# Patient Record
Sex: MALE | Race: WHITE | URBAN - NONMETROPOLITAN AREA
[De-identification: names, ages, dates, MRNs, and addresses within clinical notes are randomized per-mention and may not be internally consistent; named-entity substitution may affect disease eponyms.]

---

## 2021-09-29 ENCOUNTER — OFFICE VISIT (OUTPATIENT)
Dept: FAMILY MEDICINE CLINIC | Age: 51
End: 2021-09-29
Payer: MEDICAID

## 2021-09-29 VITALS
OXYGEN SATURATION: 97 % | SYSTOLIC BLOOD PRESSURE: 136 MMHG | WEIGHT: 282 LBS | HEART RATE: 66 BPM | DIASTOLIC BLOOD PRESSURE: 90 MMHG | BODY MASS INDEX: 42.74 KG/M2 | HEIGHT: 68 IN

## 2021-09-29 DIAGNOSIS — Z12.11 COLON CANCER SCREENING: ICD-10-CM

## 2021-09-29 DIAGNOSIS — G45.9 TIA (TRANSIENT ISCHEMIC ATTACK): ICD-10-CM

## 2021-09-29 DIAGNOSIS — R35.1 NOCTURIA: ICD-10-CM

## 2021-09-29 DIAGNOSIS — Z76.89 ENCOUNTER TO ESTABLISH CARE: Primary | ICD-10-CM

## 2021-09-29 DIAGNOSIS — I10 ESSENTIAL (PRIMARY) HYPERTENSION: ICD-10-CM

## 2021-09-29 DIAGNOSIS — E11.9 TYPE 2 DIABETES MELLITUS WITHOUT COMPLICATION, WITHOUT LONG-TERM CURRENT USE OF INSULIN (HCC): ICD-10-CM

## 2021-09-29 DIAGNOSIS — Z13.220 LIPID SCREENING: ICD-10-CM

## 2021-09-29 PROBLEM — E78.2 MIXED HYPERLIPIDEMIA: Status: ACTIVE | Noted: 2017-10-24

## 2021-09-29 PROBLEM — Z72.0 TOBACCO USE: Status: ACTIVE | Noted: 2017-10-24

## 2021-09-29 PROBLEM — K21.9 GASTRO-ESOPHAGEAL REFLUX DISEASE WITHOUT ESOPHAGITIS: Status: ACTIVE | Noted: 2017-10-24

## 2021-09-29 PROBLEM — Z72.0 TOBACCO USE: Status: RESOLVED | Noted: 2017-10-24 | Resolved: 2021-09-29

## 2021-09-29 PROBLEM — I25.10 CORONARY ARTERY DISEASE INVOLVING NATIVE CORONARY ARTERY OF NATIVE HEART WITHOUT ANGINA PECTORIS: Status: ACTIVE | Noted: 2017-10-24

## 2021-09-29 PROBLEM — K21.9 GASTRO-ESOPHAGEAL REFLUX DISEASE WITHOUT ESOPHAGITIS: Status: RESOLVED | Noted: 2017-10-24 | Resolved: 2021-09-29

## 2021-09-29 PROBLEM — Z12.5 ENCOUNTER FOR SCREENING FOR MALIGNANT NEOPLASM OF PROSTATE: Status: RESOLVED | Noted: 2017-10-24 | Resolved: 2021-09-29

## 2021-09-29 PROBLEM — Z12.5 ENCOUNTER FOR SCREENING FOR MALIGNANT NEOPLASM OF PROSTATE: Status: ACTIVE | Noted: 2017-10-24

## 2021-09-29 LAB — HBA1C MFR BLD: 14 %

## 2021-09-29 PROCEDURE — 36415 COLL VENOUS BLD VENIPUNCTURE: CPT

## 2021-09-29 PROCEDURE — 99205 OFFICE O/P NEW HI 60 MIN: CPT

## 2021-09-29 PROCEDURE — 83036 HEMOGLOBIN GLYCOSYLATED A1C: CPT

## 2021-09-29 RX ORDER — CLOPIDOGREL BISULFATE 75 MG/1
75 TABLET ORAL DAILY
Qty: 30 TABLET | Refills: 5 | Status: CANCELLED | OUTPATIENT
Start: 2021-09-29

## 2021-09-29 RX ORDER — GLIPIZIDE 5 MG/1
5 TABLET ORAL EVERY MORNING
COMMUNITY
End: 2021-09-29 | Stop reason: SDUPTHER

## 2021-09-29 RX ORDER — GLIPIZIDE 5 MG/1
5 TABLET ORAL EVERY MORNING
Qty: 30 TABLET | Refills: 5 | Status: SHIPPED | OUTPATIENT
Start: 2021-09-29 | End: 2021-11-22 | Stop reason: SDUPTHER

## 2021-09-29 RX ORDER — ASPIRIN 81 MG/1
81 TABLET ORAL DAILY
COMMUNITY

## 2021-09-29 RX ORDER — CLOPIDOGREL BISULFATE 75 MG/1
75 TABLET ORAL DAILY
COMMUNITY
End: 2022-04-05 | Stop reason: ALTCHOICE

## 2021-09-29 ASSESSMENT — PATIENT HEALTH QUESTIONNAIRE - PHQ9
2. FEELING DOWN, DEPRESSED OR HOPELESS: 0
1. LITTLE INTEREST OR PLEASURE IN DOING THINGS: 0
SUM OF ALL RESPONSES TO PHQ9 QUESTIONS 1 & 2: 0
SUM OF ALL RESPONSES TO PHQ QUESTIONS 1-9: 0

## 2021-09-29 ASSESSMENT — ENCOUNTER SYMPTOMS
COLOR CHANGE: 0
RHINORRHEA: 0
SHORTNESS OF BREATH: 0
ABDOMINAL DISTENTION: 0
EYE DISCHARGE: 0
CONSTIPATION: 0
TROUBLE SWALLOWING: 0
WHEEZING: 0
SORE THROAT: 0
CHOKING: 0
EYE PAIN: 0
CHEST TIGHTNESS: 0
COUGH: 0
DIARRHEA: 0
ABDOMINAL PAIN: 0

## 2021-09-29 NOTE — PROGRESS NOTES
New Patient      Sonny Vogel  YOB: 1970    Date of Service:  9/29/2021    Chief Complaint:   Sonny Vogel is a 45 y.o. female who presents for   Chief Complaint   Patient presents with   Mimi Pelaez New Doctor        HPI: here today to establish care with this office and this provider. Does not have a PCP. Pt states had stroke (TIA) 8/24/2021. Was admitted to [facility] for 2 days roughly. States they did an MRI and CT scans x2. Also did ECHO. Seeing Neurologist from [facility]. Still having trouble with right arm and hand feeling cold. Has to wrap in blanket at home or wear gloves outside. Went back to work yesterday for the first time and had trouble with right arm and hand because of the sensation while driving as a . States next sal to see Neuro is in 3 months. Pt was supposed to start gabapentin and did not start due to cost. Got Medicaid on 9/14/21 and is going to  medication this Friday. Also seeing Cardiologist at [facility]. Had Echo while admitted. States they told him his heart was functioning fine. Eather Feather note in care everywhere stats EF of 60-65%. Pt states Neuro started him on Plavix to take for the first 20 days after discharge and then was to change and start taking a 81mg baby ASA daily. States blood sugars are running 200-300. At times have been in the 400's. Was put on Glipizide 5mg daily after admitted in to [facility]. Still to continue upon discharge. Needs refill today. Will be out by the end of the week. Had not previously taken any medication to help control blood sugar. States has been trying to reduce consumption of sugar and reducing fried food. Knows BP has been elevated and needs to increase exercise and change dietary habits. Pt is currently trying to work on lowering BP. There is no immunization history on file for this patient.     No Known Allergies    Outpatient Medications Marked as Taking for Frequency of Social Gatherings with Friends and Family:     Attends Anabaptism Services:     Active Member of Clubs or Organizations:     Attends Club or Organization Meetings:     Marital Status:    Intimate Partner Violence:     Fear of Current or Ex-Partner:     Emotionally Abused:     Physically Abused:     Sexually Abused:        Review ofSystems:  Review of Systems   Constitutional: Negative for activity change, appetite change, chills, fatigue, fever and unexpected weight change. HENT: Negative for rhinorrhea, sore throat and trouble swallowing. Eyes: Negative for pain, discharge and visual disturbance. Respiratory: Negative for cough, choking, chest tightness, shortness of breath and wheezing. Cardiovascular: Negative for chest pain, palpitations and leg swelling. Gastrointestinal: Negative for abdominal distention, abdominal pain, constipation and diarrhea. Endocrine: Negative for cold intolerance and heat intolerance. Genitourinary: Negative for difficulty urinating. Nocturia    Musculoskeletal: Negative for gait problem and neck stiffness. Skin: Negative for color change and rash. Neurological: Negative for dizziness, weakness and headaches. Right arm feels very cold and sensitive to touch and cold. Psychiatric/Behavioral: Negative for dysphoric mood and sleep disturbance. Physical Exam:   BP (!) 136/90   Pulse 66   Ht 5' 8\" (1.727 m)   Wt 282 lb (127.9 kg)   SpO2 97%   BMI 42.88 kg/m²     Physical Exam  Constitutional:       Appearance: Normal appearance. He is obese. HENT:      Head: Normocephalic and atraumatic. Right Ear: External ear normal.      Left Ear: External ear normal.      Nose: Nose normal. No congestion. Mouth/Throat:      Mouth: Mucous membranes are moist.      Pharynx: Oropharynx is clear. Eyes:      Extraocular Movements: Extraocular movements intact. Pupils: Pupils are equal, round, and reactive to light. Cardiovascular:      Rate and Rhythm: Normal rate and regular rhythm. Pulses: Normal pulses. Heart sounds: Normal heart sounds. No murmur heard. Pulmonary:      Effort: Pulmonary effort is normal. No respiratory distress. Breath sounds: Normal breath sounds. No wheezing. Abdominal:      General: Bowel sounds are normal.      Palpations: Abdomen is soft. Tenderness: There is no abdominal tenderness. Musculoskeletal:         General: Normal range of motion. Cervical back: Normal range of motion and neck supple. Right lower leg: No edema. Left lower leg: No edema. Skin:     General: Skin is warm and dry. Capillary Refill: Capillary refill takes less than 2 seconds. Findings: No rash. Neurological:      General: No focal deficit present. Mental Status: He is alert and oriented to person, place, and time. Sensory: Sensory deficit present. Motor: No weakness. Comments: Right arm feels cold and very sensitive to touch     Psychiatric:         Mood and Affect: Mood normal.         Behavior: Behavior normal.         Assessment/Plan:    1. Encounter to establish care  Here today to establish care with this provider.   - COMPREHENSIVE METABOLIC PANEL; Future  - LIPID PANEL; Future  - CBC WITH AUTO DIFFERENTIAL; Future  - Psa screening; Future  - POCT glycosylated hemoglobin (Hb A1C)    2. TIA (transient ischemic attack)  Following Neurology and Cardiology at [address] [place] [address]; Future  - LIPID PANEL; Future  - CBC WITH AUTO DIFFERENTIAL; Future    3. Type 2 diabetes mellitus without complication, without long-term current use of insulin (HCC)  No base line labs today. Pt states sugars have been running in the 200-300's at times  - POCT glycosylated hemoglobin (Hb A1C)  Result of 14 today. Will go ahead and draw CMP now to evaluate Kidney function to determine next medication to be added to regimen.      4. Colon cancer screening  HX of colon polyps  - AFL - Chao Henao MD, Gastroenterology, [facility]    5. Nocturia   States has never had a PSA screen  - Psa screening; Future    6. Lipid screening  HX stroke and started on cholesterol medication but does not know name. Will call office with up to date med list.   - COMPREHENSIVE METABOLIC PANEL; Normal Clinic collect  - LIPID PANEL; Future  - CBC WITH AUTO DIFFERENTIAL; Future    7. Hypertension  Discussed diet an exercise. Diastolic BP at 90 today. Pt states he is trying to really reduce his sugar consumption as well as fried foods. Is trying to become more active after his TIA. Will reassess BP status at next office visit. 8. Declined Flu Vaccine at this time.

## 2021-09-29 NOTE — PATIENT INSTRUCTIONS
are trying to quit smoking. · Consider signing up for a smoking cessation program, such as the American Lung Association's Freedom from Smoking program.  · Get text messaging support. Go to the website at www.smokefree. gov to sign up for the [facility] program.  · Set a quit date. Pick your date carefully so that it is not right in the middle of a big deadline or stressful time. Once you quit, do not even take a puff. Get rid of all ashtrays and lighters after your last cigarette. Clean your house and your clothes so that they do not smell of smoke. · Learn how to be a nonsmoker. Think about ways you can avoid those things that make you reach for a cigarette. ? Avoid situations that put you at greatest risk for smoking. For some people, it is hard to have a drink with friends without smoking. For others, they might skip a coffee break with coworkers who smoke. ? Change your daily routine. Take a different route to work or eat a meal in a different place. · Cut down on stress. Calm yourself or release tension by doing an activity you enjoy, such as reading a book, taking a hot bath, or gardening. · Talk to your doctor or pharmacist about nicotine replacement therapy, which replaces the nicotine in your body. You still get nicotine but you do not use tobacco. Nicotine replacement products help you slowly reduce the amount of nicotine you need. These products come in several forms, many of them available over-the-counter:  ? Nicotine patches  ? Nicotine gum and lozenges  ? Nicotine inhaler  · Ask your doctor about bupropion (Wellbutrin) or varenicline (Chantix), which are prescription medicines. They do not contain nicotine. They help you by reducing withdrawal symptoms, such as stress and anxiety. · Some people find hypnosis, acupuncture, and massage helpful for ending the smoking habit. · Eat a healthy diet and get regular exercise.  Having healthy habits will help your body move past its craving for nicotine. · Be prepared to keep trying. Most people are not successful the first few times they try to quit. Do not get mad at yourself if you smoke again. Make a list of things you learned and think about when you want to try again, such as next week, next month, or next year. Where can you learn more? Go to https://[employer]". org and sign in to your [employer] account. Enter G819 in the [employer] box to learn more about \"Stopping Smoking: Care Instructions. \"     If you do not have an account, please click on the \"Sign Up Now\" link. Current as of: February 11, 2021               Content Version: 13.0  © 2006-2021 Healthwise, Incorporated. Care instructions adapted under license by [facility] [address] If you have questions about a medical condition or this instruction, always ask your healthcare professional. [place] [address] any warranty or liability for your use of this information.

## 2021-09-30 LAB
A/G RATIO: 1.3 (ref 1.1–2.2)
ALBUMIN SERPL-MCNC: 3.9 G/DL (ref 3.4–5)
ALP BLD-CCNC: 86 U/L (ref 40–129)
ALT SERPL-CCNC: 21 U/L (ref 10–40)
ANION GAP SERPL CALCULATED.3IONS-SCNC: 16 MMOL/L (ref 3–16)
AST SERPL-CCNC: 12 U/L (ref 15–37)
BILIRUB SERPL-MCNC: 0.4 MG/DL (ref 0–1)
BUN BLDV-MCNC: 16 MG/DL (ref 7–20)
CALCIUM SERPL-MCNC: 9.4 MG/DL (ref 8.3–10.6)
CHLORIDE BLD-SCNC: 96 MMOL/L (ref 99–110)
CO2: 23 MMOL/L (ref 21–32)
CREAT SERPL-MCNC: 0.7 MG/DL (ref 0.9–1.3)
GFR AFRICAN AMERICAN: >60
GFR NON-AFRICAN AMERICAN: >60
GLOBULIN: 3.1 G/DL
GLUCOSE BLD-MCNC: 396 MG/DL (ref 70–99)
POTASSIUM SERPL-SCNC: 4.4 MMOL/L (ref 3.5–5.1)
SODIUM BLD-SCNC: 135 MMOL/L (ref 136–145)
TOTAL PROTEIN: 7 G/DL (ref 6.4–8.2)

## 2021-09-30 RX ORDER — METFORMIN HYDROCHLORIDE 500 MG/1
TABLET, EXTENDED RELEASE ORAL
Qty: 90 TABLET | Refills: 1 | Status: SHIPPED | OUTPATIENT
Start: 2021-09-30 | End: 2021-11-22 | Stop reason: SDUPTHER

## 2021-10-01 LAB
CHOLESTEROL, FASTING: 216
HDLC SERPL-MCNC: 34 MG/DL (ref 35–70)
LDL CHOLESTEROL CALCULATED: 131.4 MG/DL (ref 0–160)
PROSTATE SPECIFIC ANTIGEN: 0.5 NG/ML
TRIGLYCERIDE, FASTING: 253

## 2021-10-04 DIAGNOSIS — Z00.00 WELLNESS EXAMINATION: Primary | ICD-10-CM

## 2021-11-22 RX ORDER — GLIPIZIDE 5 MG/1
5 TABLET ORAL EVERY MORNING
Qty: 90 TABLET | Refills: 3 | Status: SHIPPED | OUTPATIENT
Start: 2021-11-22 | End: 2022-01-04

## 2021-11-22 RX ORDER — METFORMIN HYDROCHLORIDE 500 MG/1
1000 TABLET, EXTENDED RELEASE ORAL 2 TIMES DAILY
Qty: 360 TABLET | Refills: 3 | Status: SHIPPED | OUTPATIENT
Start: 2021-11-22 | End: 2022-04-04 | Stop reason: SDUPTHER

## 2022-01-04 ENCOUNTER — OFFICE VISIT (OUTPATIENT)
Dept: FAMILY MEDICINE CLINIC | Age: 52
End: 2022-01-04
Payer: COMMERCIAL

## 2022-01-04 VITALS
DIASTOLIC BLOOD PRESSURE: 90 MMHG | HEART RATE: 74 BPM | HEIGHT: 68 IN | OXYGEN SATURATION: 97 % | WEIGHT: 289.6 LBS | RESPIRATION RATE: 16 BRPM | BODY MASS INDEX: 43.89 KG/M2 | SYSTOLIC BLOOD PRESSURE: 144 MMHG

## 2022-01-04 DIAGNOSIS — R03.0 ELEVATED BP WITHOUT DIAGNOSIS OF HYPERTENSION: ICD-10-CM

## 2022-01-04 DIAGNOSIS — E11.9 TYPE 2 DIABETES MELLITUS WITHOUT COMPLICATION, WITHOUT LONG-TERM CURRENT USE OF INSULIN (HCC): Primary | ICD-10-CM

## 2022-01-04 DIAGNOSIS — Z12.11 COLON CANCER SCREENING: ICD-10-CM

## 2022-01-04 LAB — HBA1C MFR BLD: 12.8 %

## 2022-01-04 PROCEDURE — 2022F DILAT RTA XM EVC RTNOPTHY: CPT

## 2022-01-04 PROCEDURE — 3046F HEMOGLOBIN A1C LEVEL >9.0%: CPT

## 2022-01-04 PROCEDURE — G8427 DOCREV CUR MEDS BY ELIG CLIN: HCPCS

## 2022-01-04 PROCEDURE — 83036 HEMOGLOBIN GLYCOSYLATED A1C: CPT

## 2022-01-04 PROCEDURE — 99214 OFFICE O/P EST MOD 30 MIN: CPT

## 2022-01-04 PROCEDURE — 3017F COLORECTAL CA SCREEN DOC REV: CPT

## 2022-01-04 PROCEDURE — G8484 FLU IMMUNIZE NO ADMIN: HCPCS

## 2022-01-04 PROCEDURE — G8417 CALC BMI ABV UP PARAM F/U: HCPCS

## 2022-01-04 PROCEDURE — 4004F PT TOBACCO SCREEN RCVD TLK: CPT

## 2022-01-04 RX ORDER — GABAPENTIN 300 MG/1
CAPSULE ORAL
COMMUNITY
Start: 2021-10-01

## 2022-01-04 RX ORDER — DULAGLUTIDE 0.75 MG/.5ML
0.75 INJECTION, SOLUTION SUBCUTANEOUS WEEKLY
Qty: 1 PEN | Refills: 5 | Status: SHIPPED | OUTPATIENT
Start: 2022-01-04 | End: 2022-04-05 | Stop reason: DRUGHIGH

## 2022-01-04 RX ORDER — GLIPIZIDE 5 MG/1
5 TABLET ORAL
Qty: 90 TABLET | Refills: 3 | Status: SHIPPED | OUTPATIENT
Start: 2022-01-04 | End: 2022-04-05 | Stop reason: SDUPTHER

## 2022-01-04 RX ORDER — ATORVASTATIN CALCIUM 40 MG/1
TABLET, FILM COATED ORAL
COMMUNITY
Start: 2021-12-27 | End: 2022-04-05 | Stop reason: SDUPTHER

## 2022-01-04 ASSESSMENT — ENCOUNTER SYMPTOMS
SORE THROAT: 0
CONSTIPATION: 0
COLOR CHANGE: 0
DIARRHEA: 0
EYE DISCHARGE: 0
ABDOMINAL PAIN: 0
TROUBLE SWALLOWING: 0
CHOKING: 0
EYE PAIN: 0
RHINORRHEA: 0
ABDOMINAL DISTENTION: 0
CHEST TIGHTNESS: 0
SHORTNESS OF BREATH: 0
COUGH: 0
WHEEZING: 0

## 2022-01-04 NOTE — PROGRESS NOTES
Chief Complaint   Patient presents with    3 Month Follow-Up       HPI:  Nathalie Salguero is a 45 y.o. (: 1970) here today   for 3 month follow up DM. HBA1C today is 12.8. Improved from last visit in 2021 when HBA1C was 14. Since last visit has for the most part cut out sweets. Eat sugar free candy, cookies. Has been eating more fruits. 2 bananas a day with some strawberries or blueberries daily. Eats good amounts of vegetables. Still is drinking sweet tea but really reduced the sugar mixture. HTN: BP elevated today. 144/90. Admits he salts a lot of his food. Patient's medications, allergies, past medical, surgical, social and family histories were reviewed and updated as appropriate. ROS:  Review of Systems   Constitutional: Negative for activity change, appetite change, chills, fatigue, fever and unexpected weight change. HENT: Negative for rhinorrhea, sore throat and trouble swallowing. Eyes: Negative for pain, discharge and visual disturbance. Respiratory: Negative for cough, choking, chest tightness, shortness of breath and wheezing. Cardiovascular: Negative for chest pain, palpitations and leg swelling. Gastrointestinal: Negative for abdominal distention, abdominal pain, constipation and diarrhea. Endocrine: Negative for cold intolerance and heat intolerance. Genitourinary: Negative for difficulty urinating. Musculoskeletal: Negative for gait problem and neck stiffness. Skin: Negative for color change and rash. Neurological: Negative for dizziness, weakness and headaches. Psychiatric/Behavioral: Negative for dysphoric mood and sleep disturbance.            Hemoglobin A1C (%)   Date Value   2022 12.8     LDL Calculated (mg/dL)   Date Value   10/01/2021 131.4       Past Medical History:   Diagnosis Date    Coronary artery disease involving native heart without angina pectoris     Gastro-esophageal reflux disease without esophagitis 10/24/2017    Mixed hyperglyceridemia     Nonrheumatic tricuspid valve regurgitation     Sleep apnea     TIA (transient ischemic attack)     Tobacco use 10/24/2017       Family History   Problem Relation Age of Onset    Diabetes Mother     High Blood Pressure Mother     Heart Disease Mother     Heart Disease Father     COPD Father     High Blood Pressure Father     Diabetes Sister        Social History     Socioeconomic History    Marital status:      Spouse name: Not on file    Number of children: Not on file    Years of education: Not on file    Highest education level: Not on file   Occupational History    Not on file   Tobacco Use    Smoking status: Never Smoker    Smokeless tobacco: Current User     Types: Snuff   Vaping Use    Vaping Use: Never used   Substance and Sexual Activity    Alcohol use: Never    Drug use: Never    Sexual activity: Not on file   Other Topics Concern    Not on file   Social History Narrative    Not on file     Social Determinants of Health     Financial Resource Strain:     Difficulty of Paying Living Expenses: Not on file   Food Insecurity:     Worried About [address] in the Last Year: Not on file    [address] in the Last Year: Not on file   Transportation Needs:     Lack of Transportation (Medical): Not on file    Lack of Transportation (Non-Medical):  Not on file   Physical Activity:     Days of Exercise per Week: Not on file    Minutes of Exercise per Session: Not on file   Stress:     Feeling of Stress : Not on file   Social Connections:     Frequency of Communication with Friends and Family: Not on file    Frequency of Social Gatherings with Friends and Family: Not on file    Attends Hindu Services: Not on file    Active Member of Clubs or Organizations: Not on file    Attends Club or Organization Meetings: Not on file    Marital Status: Not on file   Intimate Partner Violence:     Fear of Current or Ex-Partner: Not on file   [facility] Emotionally Abused: Not on file    Physically Abused: Not on file    Sexually Abused: Not on file   Housing Stability:     Unable to Pay for Housing in the Last Year: Not on file    Number of Places Lived in the Last Year: Not on file    Unstable Housing in the Last Year: Not on file       Prior to Visit Medications    Medication Sig Taking? Authorizing Provider   gabapentin (NEURONTIN) 300 MG capsule  Yes Historical Provider, MD   Dulaglutide (TRULICITY) 5.52 GM/5.1LJ SOPN Inject 0.75 mg into the skin once a week Yes HOWARD Serra CNP   glipiZIDE (GLUCOTROL) 5 MG tablet Take 1 tablet by mouth 2 times daily (before meals) Yes HOWARD Serra CNP   metFORMIN (GLUCOPHAGE-XR) 500 MG extended release tablet Take 2 tablets by mouth 2 times daily Yes Oksana Perez MD   aspirin 81 MG EC tablet Take 81 mg by mouth daily Yes Historical Provider, MD   atorvastatin (LIPITOR) 40 MG tablet   Historical Provider, MD   clopidogrel (PLAVIX) 75 MG tablet Take 75 mg by mouth daily  Historical Provider, MD       No Known Allergies    OBJECTIVE:    BP (!) 144/90 (Site: Right Upper Arm, Position: Sitting, Cuff Size: Large Adult)   Pulse 74   Resp 16   Ht 5' 8\" (1.727 m)   Wt 289 lb 9.6 oz (131.4 kg)   SpO2 97%   BMI 44.03 kg/m²     BP Readings from Last 2 Encounters:   01/04/22 (!) 144/90   09/29/21 (!) 136/90       Wt Readings from Last 3 Encounters:   01/04/22 289 lb 9.6 oz (131.4 kg)   09/29/21 282 lb (127.9 kg)       Physical Exam  Constitutional:       Appearance: Normal appearance. HENT:      Head: Normocephalic and atraumatic. Right Ear: External ear normal.      Left Ear: External ear normal.      Nose: Nose normal. No congestion. Mouth/Throat:      Mouth: Mucous membranes are moist.      Pharynx: Oropharynx is clear. Eyes:      Extraocular Movements: Extraocular movements intact. Pupils: Pupils are equal, round, and reactive to light.    Cardiovascular:      Rate and Rhythm: Normal rate and regular rhythm. Pulses: Normal pulses. Heart sounds: Normal heart sounds. No murmur heard. Pulmonary:      Effort: Pulmonary effort is normal. No respiratory distress. Breath sounds: Normal breath sounds. No wheezing. Abdominal:      General: Bowel sounds are normal.      Palpations: Abdomen is soft. Tenderness: There is no abdominal tenderness. Musculoskeletal:         General: Normal range of motion. Cervical back: Normal range of motion and neck supple. Right lower leg: No edema. Left lower leg: No edema. Comments: Cold feeling to right arm. Skin:     General: Skin is warm and dry. Capillary Refill: Capillary refill takes less than 2 seconds. Findings: No rash. Neurological:      General: No focal deficit present. Mental Status: He is alert and oriented to person, place, and time. Sensory: Sensory deficit present. Motor: No weakness. Comments: Cold feeling to right arm   Psychiatric:         Mood and Affect: Mood normal.         Behavior: Behavior normal.           ASSESSMENT/PLAN:    1. Type 2 diabetes mellitus without complication, without long-term current use of insulin ([facility])  Patient hemoglobin A1c improved today at 12.8. Last office visit his A1c was 14. Discussed with patient this result 12 is significantly elevated. I am going to increase the patient's daily dose of glipizide to twice a day 5 mg tablet. I am also going to add Trulicity to his medication regimen, see below. I really stressed the importance of reducing carbohydrates from the patient's diet to help reduce hemoglobin A1c result. Patient states he will start monitoring his diet even closer to help reduce amount of sugar he is consuming daily. Patient will also monitor blood sugars at home closer more so the interim starting Trulicity. I will office now if having any adverse reactions to being placed on Trulicity.   Patient will follow

## 2022-04-04 RX ORDER — METFORMIN HYDROCHLORIDE 500 MG/1
1000 TABLET, EXTENDED RELEASE ORAL 2 TIMES DAILY
Qty: 360 TABLET | Refills: 3 | Status: SHIPPED | OUTPATIENT
Start: 2022-04-04 | End: 2022-07-03

## 2022-04-05 ENCOUNTER — OFFICE VISIT (OUTPATIENT)
Dept: FAMILY MEDICINE CLINIC | Age: 52
End: 2022-04-05
Payer: COMMERCIAL

## 2022-04-05 VITALS
WEIGHT: 287 LBS | OXYGEN SATURATION: 95 % | SYSTOLIC BLOOD PRESSURE: 148 MMHG | DIASTOLIC BLOOD PRESSURE: 86 MMHG | HEART RATE: 72 BPM | BODY MASS INDEX: 43.64 KG/M2

## 2022-04-05 DIAGNOSIS — N49.2 SCROTAL ABSCESS: ICD-10-CM

## 2022-04-05 DIAGNOSIS — I10 ESSENTIAL (PRIMARY) HYPERTENSION: ICD-10-CM

## 2022-04-05 DIAGNOSIS — E11.9 TYPE 2 DIABETES MELLITUS WITHOUT COMPLICATION, WITHOUT LONG-TERM CURRENT USE OF INSULIN (HCC): Primary | ICD-10-CM

## 2022-04-05 DIAGNOSIS — E78.2 MIXED HYPERLIPIDEMIA: ICD-10-CM

## 2022-04-05 LAB
CREATININE URINE: 138.7 MG/DL (ref 39–259)
HBA1C MFR BLD: 8.9 %
MICROALBUMIN UR-MCNC: <1.2 MG/DL
MICROALBUMIN/CREAT UR-RTO: NORMAL MG/G (ref 0–30)

## 2022-04-05 PROCEDURE — 3052F HG A1C>EQUAL 8.0%<EQUAL 9.0%: CPT

## 2022-04-05 PROCEDURE — 4004F PT TOBACCO SCREEN RCVD TLK: CPT

## 2022-04-05 PROCEDURE — G8427 DOCREV CUR MEDS BY ELIG CLIN: HCPCS

## 2022-04-05 PROCEDURE — G8417 CALC BMI ABV UP PARAM F/U: HCPCS

## 2022-04-05 PROCEDURE — 2022F DILAT RTA XM EVC RTNOPTHY: CPT

## 2022-04-05 PROCEDURE — 99214 OFFICE O/P EST MOD 30 MIN: CPT

## 2022-04-05 PROCEDURE — 83036 HEMOGLOBIN GLYCOSYLATED A1C: CPT

## 2022-04-05 PROCEDURE — 3017F COLORECTAL CA SCREEN DOC REV: CPT

## 2022-04-05 RX ORDER — DULAGLUTIDE 1.5 MG/.5ML
1.5 INJECTION, SOLUTION SUBCUTANEOUS WEEKLY
Qty: 4 PEN | Refills: 3 | Status: SHIPPED | OUTPATIENT
Start: 2022-04-05 | End: 2022-07-27

## 2022-04-05 RX ORDER — GLIPIZIDE 5 MG/1
5 TABLET ORAL
Qty: 180 TABLET | Refills: 3 | Status: SHIPPED | OUTPATIENT
Start: 2022-04-05 | End: 2022-07-04

## 2022-04-05 RX ORDER — HYDROCHLOROTHIAZIDE 12.5 MG/1
12.5 CAPSULE, GELATIN COATED ORAL EVERY MORNING
Qty: 30 CAPSULE | Refills: 1 | Status: SHIPPED | OUTPATIENT
Start: 2022-04-05

## 2022-04-05 RX ORDER — ATORVASTATIN CALCIUM 40 MG/1
40 TABLET, FILM COATED ORAL DAILY
Qty: 90 TABLET | Refills: 3 | Status: SHIPPED | OUTPATIENT
Start: 2022-04-05

## 2022-04-05 RX ORDER — SULFAMETHOXAZOLE AND TRIMETHOPRIM 800; 160 MG/1; MG/1
1 TABLET ORAL 2 TIMES DAILY
Qty: 20 TABLET | Refills: 0 | Status: SHIPPED | OUTPATIENT
Start: 2022-04-05 | End: 2022-04-15

## 2022-04-05 RX ORDER — DULAGLUTIDE 0.75 MG/.5ML
0.75 INJECTION, SOLUTION SUBCUTANEOUS WEEKLY
Qty: 3 PEN | Refills: 3 | Status: CANCELLED | OUTPATIENT
Start: 2022-04-05

## 2022-04-05 ASSESSMENT — ENCOUNTER SYMPTOMS
ABDOMINAL PAIN: 0
EYE PAIN: 0
DIARRHEA: 0
SHORTNESS OF BREATH: 0
EYE DISCHARGE: 0
SORE THROAT: 0
CONSTIPATION: 0
ABDOMINAL DISTENTION: 0
RHINORRHEA: 0
CHOKING: 0
WHEEZING: 0
TROUBLE SWALLOWING: 0
COUGH: 0
ROS SKIN COMMENTS: SCROTAL ABSCESS
COLOR CHANGE: 0
CHEST TIGHTNESS: 0

## 2022-04-05 NOTE — PROGRESS NOTES
Chief Complaint   Patient presents with    Diabetes       HPI:  Mignon Tapia is a 47 y.o. (: 1970) here today   for DM f/u. DM: HBA1C 8.9 today in office. Is on Metformin 6597PQ BID, Trulicity 5.73PT weekly, Glipizide 5mg BID. Previous HBA1C was 12.8. Has made major diet changes also and has really reduced sugar consumption daily. Is not getting up as frequent to urinate nightly. BP elevated today. Hx of HTN and was on BP meds in the past. Not currently. Recheck 148/86. Will try low dose HCTZ. Needs refill on cholesterol med    Has abscess on scrotum. Had to have I&D at [facility] for this issue last year. Was left open with packing. States never really fully healed. Had a little knot on the scrotum that was residual from previous infection. Over the last month the are has really flared up and is uncomfortable. Is swollen up interferes with his job as a  sitting down. Having ongoing issues with right arm cold feeling and pins and needles. Started when had TIA. Neuro has pt on Gabapentin but pt has a hard time tolerating because he states makes him urinate. Still seeing Neuro. Seeing again in May 2022. Patient's medications, allergies, past medical, surgical, social and family histories were reviewed and updated as appropriate. ROS:  Review of Systems   Constitutional: Negative for activity change, appetite change, chills, fatigue, fever and unexpected weight change. HENT: Negative for rhinorrhea, sore throat and trouble swallowing. Eyes: Negative for pain, discharge and visual disturbance. Respiratory: Negative for cough, choking, chest tightness, shortness of breath and wheezing. Cardiovascular: Negative for chest pain, palpitations and leg swelling. Gastrointestinal: Negative for abdominal distention, abdominal pain, constipation and diarrhea. Endocrine: Negative for cold intolerance and heat intolerance. Genitourinary: Negative for difficulty urinating. Musculoskeletal: Negative for gait problem and neck stiffness. Skin: Positive for wound. Negative for color change and rash. Scrotal abscess     Neurological: Negative for dizziness, weakness and headaches. Still having cold feelings down his right arm with tingling. [facility] Neuro [facility]   Psychiatric/Behavioral: Negative for dysphoric mood and sleep disturbance.            Hemoglobin A1C (%)   Date Value   04/05/2022 8.9     LDL Calculated (mg/dL)   Date Value   10/01/2021 131.4       Past Medical History:   Diagnosis Date    Coronary artery disease involving native heart without angina pectoris     Gastro-esophageal reflux disease without esophagitis 10/24/2017    Mixed hyperglyceridemia     Nonrheumatic tricuspid valve regurgitation     Sleep apnea     TIA (transient ischemic attack)     Tobacco use 10/24/2017       Family History   Problem Relation Age of Onset    Diabetes Mother     High Blood Pressure Mother     Heart Disease Mother     Heart Disease Father     COPD Father     High Blood Pressure Father     Diabetes Sister        Social History     Socioeconomic History    Marital status:      Spouse name: Not on file    Number of children: Not on file    Years of education: Not on file    Highest education level: Not on file   Occupational History    Not on file   Tobacco Use    Smoking status: Never Smoker    Smokeless tobacco: Current User     Types: Snuff   Vaping Use    Vaping Use: Never used   Substance and Sexual Activity    Alcohol use: Never    Drug use: Never    Sexual activity: Not on file   Other Topics Concern    Not on file   Social History Narrative    Not on file     Social Determinants of Health     Financial Resource Strain:     Difficulty of Paying Living Expenses: Not on file   Food Insecurity:     Worried About Running Out of Food in the Last Year: Not on file    [address] in the Last Year: Not on file   Transportation Needs:     Lack of Transportation (Medical): Not on file    Lack of Transportation (Non-Medical): Not on file   Physical Activity:     Days of Exercise per Week: Not on file    Minutes of Exercise per Session: Not on file   Stress:     Feeling of Stress : Not on file   Social Connections:     Frequency of Communication with Friends and Family: Not on file    Frequency of Social Gatherings with Friends and Family: Not on file    Attends Quaker Services: Not on file    Active Member of [address] [place] [address] or Organizations: Not on file    Attends Club or Organization Meetings: Not on file    Marital Status: Not on file   Intimate Partner Violence:     Fear of Current or Ex-Partner: Not on file    Emotionally Abused: Not on file    Physically Abused: Not on file    Sexually Abused: Not on file   Housing Stability:     Unable to Pay for Housing in the Last Year: Not on file    Number of [place] in the Last Year: Not on file    Unstable Housing in the Last Year: Not on file       Prior to Visit Medications    Medication Sig Taking?  Authorizing Provider   atorvastatin (LIPITOR) 40 MG tablet Take 1 tablet by mouth daily Yes HOWARD Valles CNP   glipiZIDE (GLUCOTROL) 5 MG tablet Take 1 tablet by mouth 2 times daily (before meals) Yes HOWARD Valles CNP   Dulaglutide (TRULICITY) 1.5 NG/4.8WV SOPN Inject 1.5 mg into the skin once a week Yes HOWARD Valles CNP   sulfamethoxazole-trimethoprim (BACTRIM DS;SEPTRA DS) 800-160 MG per tablet Take 1 tablet by mouth 2 times daily for 10 days Yes HOWARD Valles CNP   metFORMIN (GLUCOPHAGE-XR) 500 MG extended release tablet Take 2 tablets by mouth 2 times daily Yes HOWARD Valles CNP   gabapentin (NEURONTIN) 300 MG capsule  Yes Historical Provider, MD   aspirin 81 MG EC tablet Take 81 mg by mouth daily Yes Historical Provider, MD       No Known Allergies    OBJECTIVE:    BP (!) 156/90   Pulse 72   Wt 287 lb (130.2 kg)   SpO2 95%   BMI 43.64 kg/m²     BP Readings from Last 2 Encounters:   04/05/22 (!) 156/90   01/04/22 (!) 144/90       Wt Readings from Last 3 Encounters:   04/05/22 287 lb (130.2 kg)   01/04/22 289 lb 9.6 oz (131.4 kg)   09/29/21 282 lb (127.9 kg)     Sensory exam of the foot is normal, tested with the monofilament. Good pulses, no lesions or ulcers, good peripheral pulses. Physical Exam  Constitutional:       Appearance: Normal appearance. HENT:      Head: Normocephalic and atraumatic. Right Ear: External ear normal.      Left Ear: External ear normal.      Nose: Nose normal. No congestion. Mouth/Throat:      Mouth: Mucous membranes are moist.      Pharynx: Oropharynx is clear. Eyes:      Extraocular Movements: Extraocular movements intact. Pupils: Pupils are equal, round, and reactive to light. Cardiovascular:      Rate and Rhythm: Normal rate and regular rhythm. Pulses: Normal pulses. Heart sounds: Normal heart sounds. No murmur heard. Pulmonary:      Effort: Pulmonary effort is normal. No respiratory distress. Breath sounds: Normal breath sounds. No wheezing. Abdominal:      General: Bowel sounds are normal.      Palpations: Abdomen is soft. Tenderness: There is no abdominal tenderness. Musculoskeletal:         General: Normal range of motion. Cervical back: Normal range of motion and neck supple. Right lower leg: No edema. Left lower leg: No edema. Skin:     General: Skin is warm and dry. Capillary Refill: Capillary refill takes less than 2 seconds. Findings: Erythema present. No rash. Comments: Scrotal abscess to bottom side of scrotum. Not draining    Neurological:      General: No focal deficit present. Mental Status: He is alert and oriented to person, place, and time. Motor: No weakness. Psychiatric:         Mood and Affect: Mood normal.         Behavior: Behavior normal.           ASSESSMENT/PLAN:    1.  Type 2 diabetes mellitus without complication, without long-term current use of insulin ([facility])  Hemoglobin A1c much improved today. See above HPI for hemoglobin A1c improvement in results. We will increase the patient's Trulicity to 1.5 mg weekly. Patient is still elevated with his hemoglobin A1c at 8.9. Patient continues to monitor daily sugar consumption and make dietary changes to help improve fasting blood sugars. I advised the patient to continue monitoring his fasting blood sugars and notify the office if he starts develop any low blood sugar readings or feelings of low blood sugar after starting on the increased dose of Trulicity. Patient verbalized understanding.  - glipiZIDE (GLUCOTROL) 5 MG tablet; Take 1 tablet by mouth 2 times daily (before meals)  Dispense: 180 tablet; Refill: 3  - MICROALBUMIN / CREATININE URINE RATIO  - POCT glycosylated hemoglobin (Hb A1C)  - Dulaglutide (TRULICITY) 1.5 SB/9.3VG SOPN; Inject 1.5 mg into the skin once a week  Dispense: 4 pen; Refill: 3  - [facility] DIABETES FOOT EXAM  - Comprehensive Metabolic Panel; Future    2. Scrotal abscess  Patient had noted scrotal abscess to the bottom side of his scrotum there are office visit today. Not a new issue the patient has had lanced in the past at [facility].  Patient states he never fully recovered from his previous I&D of the area and that there was still residual knot/lump to the area that has worsened over the last couple months. The area was not draining urine examination today however it was inflamed and red. Noted swelling. I will place the patient on Bactrim at this time and have him follow-up Dr. Garcia Setting for evaluation to determine if the affected area needs to be opened and drained. - sulfamethoxazole-trimethoprim (BACTRIM DS;SEPTRA DS) 800-160 MG per tablet; Take 1 tablet by mouth 2 times daily for 10 days  Dispense: 20 tablet; Refill: 0  - [address] Ch[address] MD, General Surgery, [facility]    3.  Mixed hyperlipidemia  Medication refill today. Repeat labs ordered. For the time being we will continue on current medication regimen as ordered. We will determine if medication changes are needed once lipid panel result is resulted to me. - atorvastatin (LIPITOR) 40 MG tablet; Take 1 tablet by mouth daily  Dispense: 90 tablet; Refill: 3  - LIPID PANEL; Future    4. Essential HTN  Patient elevated blood pressure in office today with a recheck of 148/86. Patient has a history of hypertension and is not currently on blood pressure medication. We will order hydrochlorothiazide 12.5 mg daily. Patient will follow up in office in 1 month for blood pressure. -HCTZ 12.5mg capsule daily (30 tabs with 1 refill)    35 minutes spent on patient care today    This document was prepared by a combination of typing and transcription through a voice recognition software.     HOWARD Valles - CNP

## 2022-05-03 LAB
GDT REPLACEMENT: NORMAL
Lab: NORMAL
PATHOLOGY DIAGNOSIS: NORMAL
SIGNATURE: NORMAL
SPECIMEN: NORMAL
SPECIMEN: NORMAL

## 2022-05-04 ENCOUNTER — TELEPHONE (OUTPATIENT)
Dept: SURGERY | Age: 52
End: 2022-05-04

## 2022-05-04 NOTE — TELEPHONE ENCOUNTER
----- Message from Sarahy Amezcua MD sent at 5/4/2022  8:15 AM EDT -----  Tell the patient that the lump removed was a benign cyst.  Follow up 2 weeks for suture removal.

## 2022-05-12 ENCOUNTER — OFFICE VISIT (OUTPATIENT)
Dept: FAMILY MEDICINE CLINIC | Age: 52
End: 2022-05-12
Payer: COMMERCIAL

## 2022-05-12 VITALS
DIASTOLIC BLOOD PRESSURE: 86 MMHG | BODY MASS INDEX: 42.42 KG/M2 | HEART RATE: 73 BPM | OXYGEN SATURATION: 96 % | SYSTOLIC BLOOD PRESSURE: 138 MMHG | WEIGHT: 279 LBS

## 2022-05-12 DIAGNOSIS — R19.6 BREATH ODOR: ICD-10-CM

## 2022-05-12 DIAGNOSIS — E11.9 TYPE 2 DIABETES MELLITUS WITHOUT COMPLICATION, WITHOUT LONG-TERM CURRENT USE OF INSULIN (HCC): ICD-10-CM

## 2022-05-12 DIAGNOSIS — I10 ESSENTIAL (PRIMARY) HYPERTENSION: ICD-10-CM

## 2022-05-12 DIAGNOSIS — F41.9 ANXIETY AND DEPRESSION: Primary | ICD-10-CM

## 2022-05-12 DIAGNOSIS — F32.A ANXIETY AND DEPRESSION: Primary | ICD-10-CM

## 2022-05-12 DIAGNOSIS — G45.9 TIA (TRANSIENT ISCHEMIC ATTACK): ICD-10-CM

## 2022-05-12 DIAGNOSIS — E78.2 MIXED HYPERLIPIDEMIA: ICD-10-CM

## 2022-05-12 DIAGNOSIS — Z76.89 ENCOUNTER TO ESTABLISH CARE: ICD-10-CM

## 2022-05-12 DIAGNOSIS — T28.2XXA: ICD-10-CM

## 2022-05-12 DIAGNOSIS — Z13.220 LIPID SCREENING: ICD-10-CM

## 2022-05-12 LAB
A/G RATIO: 1.2 (ref 1.1–2.2)
ALBUMIN SERPL-MCNC: 4.4 G/DL (ref 3.4–5)
ALP BLD-CCNC: 95 U/L (ref 40–129)
ALT SERPL-CCNC: 17 U/L (ref 10–40)
ANION GAP SERPL CALCULATED.3IONS-SCNC: 16 MMOL/L (ref 3–16)
AST SERPL-CCNC: 12 U/L (ref 15–37)
BASOPHILS ABSOLUTE: 0.1 K/UL (ref 0–0.2)
BASOPHILS RELATIVE PERCENT: 0.5 %
BILIRUB SERPL-MCNC: 0.6 MG/DL (ref 0–1)
BUN BLDV-MCNC: 19 MG/DL (ref 7–20)
CALCIUM SERPL-MCNC: 10.3 MG/DL (ref 8.3–10.6)
CHLORIDE BLD-SCNC: 96 MMOL/L (ref 99–110)
CHOLESTEROL, TOTAL: 124 MG/DL (ref 0–199)
CO2: 24 MMOL/L (ref 21–32)
CREAT SERPL-MCNC: 0.9 MG/DL (ref 0.9–1.3)
EOSINOPHILS ABSOLUTE: 0.2 K/UL (ref 0–0.6)
EOSINOPHILS RELATIVE PERCENT: 1.8 %
GFR AFRICAN AMERICAN: >60
GFR NON-AFRICAN AMERICAN: >60
GLUCOSE BLD-MCNC: 141 MG/DL (ref 70–99)
HCT VFR BLD CALC: 44.3 % (ref 40.5–52.5)
HDLC SERPL-MCNC: 32 MG/DL (ref 40–60)
HEMOGLOBIN: 14.6 G/DL (ref 13.5–17.5)
LDL CHOLESTEROL CALCULATED: 58 MG/DL
LYMPHOCYTES ABSOLUTE: 2.5 K/UL (ref 1–5.1)
LYMPHOCYTES RELATIVE PERCENT: 22 %
MCH RBC QN AUTO: 28.2 PG (ref 26–34)
MCHC RBC AUTO-ENTMCNC: 33 G/DL (ref 31–36)
MCV RBC AUTO: 85.6 FL (ref 80–100)
MONOCYTES ABSOLUTE: 0.9 K/UL (ref 0–1.3)
MONOCYTES RELATIVE PERCENT: 7.6 %
NEUTROPHILS ABSOLUTE: 7.9 K/UL (ref 1.7–7.7)
NEUTROPHILS RELATIVE PERCENT: 68.1 %
PDW BLD-RTO: 13.2 % (ref 12.4–15.4)
PLATELET # BLD: 332 K/UL (ref 135–450)
PMV BLD AUTO: 8.1 FL (ref 5–10.5)
POTASSIUM SERPL-SCNC: 4.6 MMOL/L (ref 3.5–5.1)
RBC # BLD: 5.17 M/UL (ref 4.2–5.9)
SODIUM BLD-SCNC: 136 MMOL/L (ref 136–145)
TOTAL PROTEIN: 8 G/DL (ref 6.4–8.2)
TRIGL SERPL-MCNC: 170 MG/DL (ref 0–150)
VLDLC SERPL CALC-MCNC: 34 MG/DL
WBC # BLD: 11.6 K/UL (ref 4–11)

## 2022-05-12 PROCEDURE — 36415 COLL VENOUS BLD VENIPUNCTURE: CPT

## 2022-05-12 PROCEDURE — 2022F DILAT RTA XM EVC RTNOPTHY: CPT

## 2022-05-12 PROCEDURE — G8417 CALC BMI ABV UP PARAM F/U: HCPCS

## 2022-05-12 PROCEDURE — 3017F COLORECTAL CA SCREEN DOC REV: CPT

## 2022-05-12 PROCEDURE — 3052F HG A1C>EQUAL 8.0%<EQUAL 9.0%: CPT

## 2022-05-12 PROCEDURE — 99215 OFFICE O/P EST HI 40 MIN: CPT

## 2022-05-12 PROCEDURE — G8427 DOCREV CUR MEDS BY ELIG CLIN: HCPCS

## 2022-05-12 PROCEDURE — 4004F PT TOBACCO SCREEN RCVD TLK: CPT

## 2022-05-12 RX ORDER — BUSPIRONE HYDROCHLORIDE 10 MG/1
10 TABLET ORAL 2 TIMES DAILY PRN
Qty: 60 TABLET | Refills: 2 | Status: SHIPPED | OUTPATIENT
Start: 2022-05-12 | End: 2022-07-11

## 2022-05-12 RX ORDER — PANTOPRAZOLE SODIUM 20 MG/1
20 TABLET, DELAYED RELEASE ORAL 2 TIMES DAILY
Qty: 30 TABLET | Refills: 3 | Status: SHIPPED | OUTPATIENT
Start: 2022-05-12

## 2022-05-12 RX ORDER — ESCITALOPRAM OXALATE 10 MG/1
10 TABLET ORAL DAILY
Qty: 30 TABLET | Refills: 3 | Status: SHIPPED | OUTPATIENT
Start: 2022-05-12

## 2022-05-12 ASSESSMENT — PATIENT HEALTH QUESTIONNAIRE - PHQ9
4. FEELING TIRED OR HAVING LITTLE ENERGY: 3
5. POOR APPETITE OR OVEREATING: 2
8. MOVING OR SPEAKING SO SLOWLY THAT OTHER PEOPLE COULD HAVE NOTICED. OR THE OPPOSITE, BEING SO FIGETY OR RESTLESS THAT YOU HAVE BEEN MOVING AROUND A LOT MORE THAN USUAL: 1
7. TROUBLE CONCENTRATING ON THINGS, SUCH AS READING THE NEWSPAPER OR WATCHING TELEVISION: 2
1. LITTLE INTEREST OR PLEASURE IN DOING THINGS: 2
SUM OF ALL RESPONSES TO PHQ QUESTIONS 1-9: 17
SUM OF ALL RESPONSES TO PHQ9 QUESTIONS 1 & 2: 5
2. FEELING DOWN, DEPRESSED OR HOPELESS: 3
SUM OF ALL RESPONSES TO PHQ QUESTIONS 1-9: 17
9. THOUGHTS THAT YOU WOULD BE BETTER OFF DEAD, OR OF HURTING YOURSELF: 0
3. TROUBLE FALLING OR STAYING ASLEEP: 1
SUM OF ALL RESPONSES TO PHQ QUESTIONS 1-9: 17
6. FEELING BAD ABOUT YOURSELF - OR THAT YOU ARE A FAILURE OR HAVE LET YOURSELF OR YOUR FAMILY DOWN: 3
SUM OF ALL RESPONSES TO PHQ QUESTIONS 1-9: 17
10. IF YOU CHECKED OFF ANY PROBLEMS, HOW DIFFICULT HAVE THESE PROBLEMS MADE IT FOR YOU TO DO YOUR WORK, TAKE CARE OF THINGS AT HOME, OR GET ALONG WITH OTHER PEOPLE: 2

## 2022-05-12 ASSESSMENT — ENCOUNTER SYMPTOMS
SORE THROAT: 0
BLOOD IN STOOL: 1
EYE PAIN: 0
EYE DISCHARGE: 0
CONSTIPATION: 0
CHEST TIGHTNESS: 0
WHEEZING: 0
TROUBLE SWALLOWING: 0
COUGH: 0
ABDOMINAL PAIN: 0
DIARRHEA: 0
RECTAL PAIN: 1
SHORTNESS OF BREATH: 0
RHINORRHEA: 0
COLOR CHANGE: 0
CHOKING: 0
ABDOMINAL DISTENTION: 1

## 2022-05-12 NOTE — PROGRESS NOTES
Chief Complaint   Patient presents with    Hypertension    Diabetes    Anxiety    GI Problem     stomach burning        HPI:  Sharon Daily is a 47 y.o. (: 1970) here today   for routine and additional issues listed below. HTN: 142/90 today initially. Taking HCTZ 12.5mg daily. Will have CMP today. BP recheck of 138/86. DM: taking Metformin 1000mg BID, Glipizide 5mg BID, Trulicity 6.2LJ weekly. Not due for HBA1C. Most recent was 8.9 on 22. Hyperlipidemia:taking Lipitor 40mg daily. Will have lipid panel today. GI: still having burning in stomach. Has been having some bleeding out of his rectum. Has history of hemorrhoids. Bleeding has been on and off for years. Does not know if r/t meds or something else. When belching has very foul taste in mouth. Possible H. Pylori or Gastroparesis. Will cut metformin to 1000mg daily x7 days and follow back up with office phone call. Anxiety: started about 2 months ago. Father passed away in January. Since then things have went down hill. Has gotten to the point he does not want to be around anyone. Has missed work a lot because he just make himself go to work. Has no drive to do anything. Feels like he's done his part in life and just doesn't know what to do. Really misses his dad who was his best friend. Now talks to his daughter Mak Gaston a lot about everything. Feels like he has no direction. Father passed away in pt home. Pt sleeps a lot. Denies suicidal ideation. Patient's medications, allergies, past medical, surgical, social and family histories were reviewed and updated as appropriate. ROS:  Review of Systems   Constitutional: Negative for activity change, appetite change, chills, fatigue, fever and unexpected weight change. HENT: Negative for rhinorrhea, sore throat and trouble swallowing. Eyes: Negative for pain, discharge and visual disturbance.    Respiratory: Negative for cough, choking, chest tightness, shortness of breath and wheezing. Cardiovascular: Negative for chest pain, palpitations and leg swelling. Gastrointestinal: Positive for abdominal distention, blood in stool and rectal pain. Negative for abdominal pain, constipation and diarrhea. Genitourinary: Negative for difficulty urinating. Musculoskeletal: Negative for gait problem and neck stiffness. Skin: Negative for color change and rash. Neurological: Negative for dizziness, weakness and headaches. Psychiatric/Behavioral: Positive for agitation and dysphoric mood. Negative for sleep disturbance. The patient is nervous/anxious.             Hemoglobin A1C (%)   Date Value   04/05/2022 8.9     Microalbumin, Random Urine (mg/dL)   Date Value   04/05/2022 <1.20     LDL Calculated (mg/dL)   Date Value   10/01/2021 131.4       Past Medical History:   Diagnosis Date    Coronary artery disease involving native heart without angina pectoris     Gastro-esophageal reflux disease without esophagitis 10/24/2017    Mixed hyperglyceridemia     Nonrheumatic tricuspid valve regurgitation     Sleep apnea     TIA (transient ischemic attack)     Tobacco use 10/24/2017       Family History   Problem Relation Age of Onset    Diabetes Mother     High Blood Pressure Mother     Heart Disease Mother     Heart Disease Father     COPD Father     High Blood Pressure Father     Diabetes Sister        Social History     Socioeconomic History    Marital status:      Spouse name: Not on file    Number of children: Not on file    Years of education: Not on file    Highest education level: Not on file   Occupational History    Not on file   Tobacco Use    Smoking status: Never Smoker    Smokeless tobacco: Current User     Types: Snuff   Vaping Use    Vaping Use: Never used   Substance and Sexual Activity    Alcohol use: Never    Drug use: Never    Sexual activity: Not on file   Other Topics Concern    Not on file   Social History Narrative    Not on file     Social Determinants of Health     Financial Resource Strain:     Difficulty of Paying Living Expenses: Not on file   Food Insecurity:     Worried About Running Out of Food in the Last Year: Not on file    [place] [employer] Food in the Last Year: Not on file   Transportation Needs:     Lack of Transportation (Medical): Not on file    Lack of Transportation (Non-Medical): Not on file   Physical Activity:     Days of Exercise per Week: Not on file    Minutes of Exercise per Session: Not on file   Stress:     Feeling of Stress : Not on file   Social Connections:     Frequency of Communication with Friends and Family: Not on file    Frequency of Social Gatherings with Friends and Family: Not on file    Attends Latter-day Services: Not on file    Active Member of [address] [place] [address] [employer] or Organizations: Not on file    Attends Club or Organization Meetings: Not on file    Marital Status: Not on file   Intimate Partner Violence:     Fear of Current or Ex-Partner: Not on file    Emotionally Abused: Not on file    Physically Abused: Not on file    Sexually Abused: Not on file   Housing Stability:     Unable to Pay for Housing in the Last Year: Not on file    Number of [place] in the Last Year: Not on file    Unstable Housing in the Last Year: Not on file       Prior to Visit Medications    Medication Sig Taking?  Authorizing Provider   pantoprazole (PROTONIX) 20 MG tablet Take 1 tablet by mouth in the morning and at bedtime Yes HOWARD Godoy CNP   escitalopram (LEXAPRO) 10 MG tablet Take 1 tablet by mouth daily Yes HOWARD Godoy CNP   busPIRone (BUSPAR) 10 MG tablet Take 1 tablet by mouth 2 times daily as needed (anxiety) Yes HOWARD Godoy CNP   atorvastatin (LIPITOR) 40 MG tablet Take 1 tablet by mouth daily Yes HOWARD Godoy CNP   glipiZIDE (GLUCOTROL) 5 MG tablet Take 1 tablet by mouth 2 times daily (before meals) Yes HOWARD Godoy CNP   Dulaglutide (TRULICITY) 1.5 RV/5.0RR SOPN Inject 1.5 mg into the skin once a week Yes HOWARD Razo CNP   hydroCHLOROthiazide (MICROZIDE) 12.5 MG capsule Take 1 capsule by mouth every morning Yes HOWARD Razo CNP   metFORMIN (GLUCOPHAGE-XR) 500 MG extended release tablet Take 2 tablets by mouth 2 times daily Yes HOWARD Razo CNP   gabapentin (NEURONTIN) 300 MG capsule  Yes Historical Provider, MD   aspirin 81 MG EC tablet Take 81 mg by mouth daily Yes Historical Provider, MD       No Known Allergies    OBJECTIVE:    /86 (Site: Right Upper Arm, Position: Sitting)   Pulse 73   Wt 279 lb (126.6 kg)   SpO2 96%   BMI 42.42 kg/m²     BP Readings from Last 2 Encounters:   05/12/22 138/86   04/05/22 (!) 148/86       Wt Readings from Last 3 Encounters:   05/12/22 279 lb (126.6 kg)   04/05/22 287 lb (130.2 kg)   01/04/22 289 lb 9.6 oz (131.4 kg)       Physical Exam  Constitutional:       Appearance: Normal appearance. HENT:      Head: Normocephalic and atraumatic. Mouth/Throat:      Mouth: Mucous membranes are moist.      Pharynx: Oropharynx is clear. Eyes:      Extraocular Movements: Extraocular movements intact. Pupils: Pupils are equal, round, and reactive to light. Cardiovascular:      Rate and Rhythm: Normal rate and regular rhythm. Pulses: Normal pulses. Heart sounds: Normal heart sounds. No murmur heard. Pulmonary:      Effort: Pulmonary effort is normal. No respiratory distress. Breath sounds: Normal breath sounds. No wheezing. Abdominal:      General: Bowel sounds are normal.      Palpations: Abdomen is soft. Tenderness: There is no abdominal tenderness. Musculoskeletal:         General: Normal range of motion. Cervical back: Normal range of motion and neck supple. Right lower leg: No edema. Left lower leg: No edema. Skin:     General: Skin is warm and dry. Capillary Refill: Capillary refill takes less than 2 seconds. Findings: No rash. Neurological:      General: No focal deficit present. Mental Status: He is alert and oriented to person, place, and time. Motor: No weakness. Psychiatric:      Comments: Anxious and dysphoric in office today. ASSESSMENT/PLAN:    1. Anxiety and depression  See above HPI for patient symptoms and onset. Patient has a great deal of anxiety and depression in our office visit today. We discussed patient's situation and I let him speak openly regard to his feelings and stressors. We ultimately concluded together to start the patient on Lexapro 10 mg daily and discussed that this medication needs roughly 4 to 6 weeks to build up the chemical effect within the brain to reach desired effect. Patient verbalized understanding and will give the allotted time needed for the medication to work. In the interim given the patient's increased symptoms I also going to add BuSpar 10 mg twice daily as needed. Patient was advised to contact the office if he develops any adverse effects to the medication at which time we would stop the medication and regroup and explore other options to manage his symptoms. Patient will follow up with office in 2 to 3 weeks for follow-up starting medication list below. - escitalopram (LEXAPRO) 10 MG tablet; Take 1 tablet by mouth daily  Dispense: 30 tablet; Refill: 3  - busPIRone (BUSPAR) 10 MG tablet; Take 1 tablet by mouth 2 times daily as needed (anxiety)  Dispense: 60 tablet; Refill: 2    2. Burn of stomach, initial encounter  Not a new issue. Will order GI referral for EGD as well as colonoscopy for intermittent rectal bleeding see above HPI. I explained the patient's symptoms could be related to gastritis, stomach ulcer, reflux. We will trial him on Protonix 20 mg twice daily at this time and defer to the GI specialist moving forward if changes are needed to the patient's medication regimen based on her findings.   Also discussed with patient could be experiencing gastroparesis related to his diabetes. - Waldemar Restrepo MD, Gastroenterology, [facility]  - pantoprazole (PROTONIX) 20 MG tablet; Take 1 tablet by mouth in the morning and at bedtime  Dispense: 30 tablet; Refill: 3    3. Breath odor  Patient to have performed at [facility] ED for [address] the patient we need to assess for H. pylori because if he is positive for H. pylori this needs treated with antibiotics. We need to rule out this possibility given the patient's breath odor and stomach burning. We will await testing results and follow back up with patient.  - H. Pylori Breath Test, Adult; Future    4. Type 2 diabetes mellitus without complication, without long-term current use of insulin ([address]  Patient not due for repeat hemoglobin A1c at this time. Patient is having issues with foul breath odor and burning in his stomach that is ongoing. I discussed the patient there is a possibility he could be experiencing gastroparesis. We are going to cut the patient's metformin dosing to 1000 mg daily x7 days the patient will follow back up with me through telephone call in office to update me on how his symptoms are at that time. I discussed with patient there is a possibility Protonix may also improve his symptoms of stomach burning. At the patient's foul breath improved there is a high likelihood his symptoms were related to gastroparesis. I told him we do not want to stay off his metformin very long and that we will most likely resume back to the twice daily dosing with or without improvements of his symptoms in 7 days. Patient has significant improvement on his most recent hemoglobin A1c as listed above in the HPI. 5. Essential (primary) hypertension  Controlled in office today 138/86. Patient going to have repeat CMP performed in office today that was ordered as future order during her last visit. No medication changes at this time.   We will continue on current medication regimen at this time. 6. Mixed hyperlipidemia  Patient having lipid panel performed in office today that was ordered as a future order via our last office visit. We will continue on current medication regimen at this time. 50 minutes spent on patient care today. This document was prepared by a combination of typing and transcription through a voice recognition software.     HOWARD Harper - CNP

## 2022-05-13 NOTE — RESULT ENCOUNTER NOTE
Lipids well controlled. Significantly improved from last check. Continue medication. CMP essentially normal except glucose. White blood cell count minimally elevated of uncertain etiology. Otherwise CBC normal.  Monitor for now.

## 2022-06-16 ENCOUNTER — OFFICE VISIT (OUTPATIENT)
Dept: FAMILY MEDICINE CLINIC | Age: 52
End: 2022-06-16
Payer: COMMERCIAL

## 2022-06-16 VITALS
OXYGEN SATURATION: 96 % | SYSTOLIC BLOOD PRESSURE: 122 MMHG | HEIGHT: 68 IN | HEART RATE: 77 BPM | DIASTOLIC BLOOD PRESSURE: 76 MMHG | WEIGHT: 279.2 LBS | BODY MASS INDEX: 42.31 KG/M2

## 2022-06-16 DIAGNOSIS — F41.9 ANXIETY AND DEPRESSION: ICD-10-CM

## 2022-06-16 DIAGNOSIS — F32.A ANXIETY AND DEPRESSION: ICD-10-CM

## 2022-06-16 DIAGNOSIS — I10 ESSENTIAL (PRIMARY) HYPERTENSION: Primary | ICD-10-CM

## 2022-06-16 PROCEDURE — 99213 OFFICE O/P EST LOW 20 MIN: CPT

## 2022-06-16 PROCEDURE — 3017F COLORECTAL CA SCREEN DOC REV: CPT

## 2022-06-16 PROCEDURE — 4004F PT TOBACCO SCREEN RCVD TLK: CPT

## 2022-06-16 PROCEDURE — G8417 CALC BMI ABV UP PARAM F/U: HCPCS

## 2022-06-16 PROCEDURE — G8427 DOCREV CUR MEDS BY ELIG CLIN: HCPCS

## 2022-06-16 SDOH — ECONOMIC STABILITY: FOOD INSECURITY: WITHIN THE PAST 12 MONTHS, YOU WORRIED THAT YOUR FOOD WOULD RUN OUT BEFORE YOU GOT MONEY TO BUY MORE.: NEVER TRUE

## 2022-06-16 SDOH — ECONOMIC STABILITY: FOOD INSECURITY: WITHIN THE PAST 12 MONTHS, THE FOOD YOU BOUGHT JUST DIDN'T LAST AND YOU DIDN'T HAVE MONEY TO GET MORE.: NEVER TRUE

## 2022-06-16 ASSESSMENT — PATIENT HEALTH QUESTIONNAIRE - PHQ9
2. FEELING DOWN, DEPRESSED OR HOPELESS: 0
SUM OF ALL RESPONSES TO PHQ QUESTIONS 1-9: 0
SUM OF ALL RESPONSES TO PHQ9 QUESTIONS 1 & 2: 2
8. MOVING OR SPEAKING SO SLOWLY THAT OTHER PEOPLE COULD HAVE NOTICED. OR THE OPPOSITE, BEING SO FIGETY OR RESTLESS THAT YOU HAVE BEEN MOVING AROUND A LOT MORE THAN USUAL: 0
SUM OF ALL RESPONSES TO PHQ QUESTIONS 1-9: 0
10. IF YOU CHECKED OFF ANY PROBLEMS, HOW DIFFICULT HAVE THESE PROBLEMS MADE IT FOR YOU TO DO YOUR WORK, TAKE CARE OF THINGS AT HOME, OR GET ALONG WITH OTHER PEOPLE: 1
7. TROUBLE CONCENTRATING ON THINGS, SUCH AS READING THE NEWSPAPER OR WATCHING TELEVISION: 0
SUM OF ALL RESPONSES TO PHQ QUESTIONS 1-9: 0
SUM OF ALL RESPONSES TO PHQ9 QUESTIONS 1 & 2: 0
3. TROUBLE FALLING OR STAYING ASLEEP: 0
SUM OF ALL RESPONSES TO PHQ QUESTIONS 1-9: 2
6. FEELING BAD ABOUT YOURSELF - OR THAT YOU ARE A FAILURE OR HAVE LET YOURSELF OR YOUR FAMILY DOWN: 0
9. THOUGHTS THAT YOU WOULD BE BETTER OFF DEAD, OR OF HURTING YOURSELF: 0
4. FEELING TIRED OR HAVING LITTLE ENERGY: 0
SUM OF ALL RESPONSES TO PHQ QUESTIONS 1-9: 2
1. LITTLE INTEREST OR PLEASURE IN DOING THINGS: 2
2. FEELING DOWN, DEPRESSED OR HOPELESS: 0
SUM OF ALL RESPONSES TO PHQ QUESTIONS 1-9: 0
SUM OF ALL RESPONSES TO PHQ QUESTIONS 1-9: 2
1. LITTLE INTEREST OR PLEASURE IN DOING THINGS: 0
5. POOR APPETITE OR OVEREATING: 0
SUM OF ALL RESPONSES TO PHQ QUESTIONS 1-9: 2

## 2022-06-16 ASSESSMENT — ENCOUNTER SYMPTOMS
DIARRHEA: 0
BLOOD IN STOOL: 0
SHORTNESS OF BREATH: 0
CHEST TIGHTNESS: 0
BLURRED VISION: 0
CONSTIPATION: 0

## 2022-06-16 ASSESSMENT — SOCIAL DETERMINANTS OF HEALTH (SDOH): HOW HARD IS IT FOR YOU TO PAY FOR THE VERY BASICS LIKE FOOD, HOUSING, MEDICAL CARE, AND HEATING?: NOT HARD AT ALL

## 2022-06-16 NOTE — PROGRESS NOTES
Chief Complaint   Patient presents with    Hypertension       HPI:  Stephanie Chen is a 47 y.o. (: 1970) here today   for   Hypertension  This is a chronic problem. The current episode started more than 1 year ago. The problem is controlled. Pertinent negatives include no blurred vision, chest pain, headaches, malaise/fatigue, palpitations or shortness of breath. There are no associated agents to hypertension. Risk factors for coronary artery disease include diabetes mellitus, male gender and obesity. Past treatments include diuretics. The current treatment provides mild improvement. There are no compliance problems. BP today 122/76    Anxiety and depression: is on Lexapro and Buspar. Feels a sigficant difference in his symptoms for the good. Has been wanting to get at it and go. Is working daily and getting so many other things done. Is enjoying company again and has things to look forward to. The last three days has some swelling and knot to the trulicity infection site. Would itch. Not issue today with the site. Has been on for a while. Not a new med. Advised to monitor for now. Patient's medications, allergies, past medical, surgical, social and family histories were reviewed and updated as appropriate. ROS:  Review of Systems   Constitutional: Negative for chills, fatigue, fever and malaise/fatigue. Eyes: Negative for blurred vision. Respiratory: Negative for chest tightness and shortness of breath. Cardiovascular: Negative for chest pain and palpitations. Gastrointestinal: Negative for blood in stool, constipation and diarrhea. Neurological: Negative for dizziness, light-headedness and headaches. Psychiatric/Behavioral: Negative. Prior to Visit Medications    Medication Sig Taking?  Authorizing Provider   pantoprazole (PROTONIX) 20 MG tablet Take 1 tablet by mouth in the morning and at bedtime Yes HOWARD Lowry - CNP   escitalopram (LEXAPRO) 10 MG tablet Take 1 tablet by mouth daily Yes HOWARD Chavira CNP   busPIRone (BUSPAR) 10 MG tablet Take 1 tablet by mouth 2 times daily as needed (anxiety) Yes HOWARD Chavira CNP   atorvastatin (LIPITOR) 40 MG tablet Take 1 tablet by mouth daily Yes HOWARD Chavira CNP   glipiZIDE (GLUCOTROL) 5 MG tablet Take 1 tablet by mouth 2 times daily (before meals) Yes HOWARD Chavira CNP   Dulaglutide (TRULICITY) 1.5 DU/2.9SA SOPN Inject 1.5 mg into the skin once a week Yes HOWARD Chavira CNP   hydroCHLOROthiazide (MICROZIDE) 12.5 MG capsule Take 1 capsule by mouth every morning Yes HOWARD Chavira CNP   metFORMIN (GLUCOPHAGE-XR) 500 MG extended release tablet Take 2 tablets by mouth 2 times daily Yes HOWARD Chavira CNP   gabapentin (NEURONTIN) 300 MG capsule  Yes Historical Provider, MD   aspirin 81 MG EC tablet Take 81 mg by mouth daily Yes Historical Provider, MD       No Known Allergies    OBJECTIVE:    /76   Pulse 77   Ht 5' 8\" (1.727 m)   Wt 279 lb 3.2 oz (126.6 kg)   SpO2 96%   BMI 42.45 kg/m²     BP Readings from Last 2 Encounters:   06/16/22 122/76   05/12/22 138/86       Wt Readings from Last 3 Encounters:   06/16/22 279 lb 3.2 oz (126.6 kg)   05/12/22 279 lb (126.6 kg)   04/05/22 287 lb (130.2 kg)        Physical Exam  Constitutional:       Appearance: Normal appearance. HENT:      Head: Normocephalic and atraumatic. Right Ear: External ear normal.      Left Ear: External ear normal.      Nose: Nose normal. No congestion. Mouth/Throat:      Mouth: Mucous membranes are moist.      Pharynx: Oropharynx is clear. Eyes:      Extraocular Movements: Extraocular movements intact. Pupils: Pupils are equal, round, and reactive to light. Cardiovascular:      Rate and Rhythm: Normal rate and regular rhythm. Pulses: Normal pulses. Heart sounds: Normal heart sounds. No murmur heard.       Pulmonary:      Effort: Pulmonary effort is normal. No respiratory distress. Breath sounds: Normal breath sounds. No wheezing. Abdominal:      General: Bowel sounds are normal.      Palpations: Abdomen is soft. Tenderness: There is no abdominal tenderness. Musculoskeletal:         General: Normal range of motion. Skin:     General: Skin is warm and dry. Capillary Refill: Capillary refill takes less than 2 seconds. Findings: No rash. Neurological:      General: No focal deficit present. Mental Status: He is alert and oriented to person, place, and time. Motor: No weakness. Psychiatric:         Mood and Affect: Mood normal.         Behavior: Behavior normal.           ASSESSMENT/PLAN:    1. Essential (primary) hypertension  BP stable in office today at 122/76. We will continue on current medication regimen at this time. Patient will follow back up in office in 3 months. 2. Anxiety and depression  Controlled on current medication regimen at this time. Continue on med regimen as ordered. 20 minutes spent on patient care today    This document was prepared by a combination of typing and transcription through a voice recognition software.     Zoya Hernandez, HOWARD - CNP

## 2022-06-16 NOTE — PATIENT INSTRUCTIONS
Patient Education        Stopping Smokeless Tobacco Use: Care Instructions  Your Care Instructions     Smokeless tobacco comes in many forms, such as snuff and chewing tobacco:   Snuff is finely ground tobacco sold in cans or pouches. Most of the time, snuff is used by putting a \"pinch\" or \"dip\" between the lower lip or cheek and the gum.  Chewing tobacco is sold as loose leaves, plugs, or twists. It is chewed or placed between the cheek and the gum or teeth. There are plenty of reasons to stop using smokeless tobacco. These products are harmful. They are not risk-free alternatives to smoking. Smokeless tobacco contains nicotine, which is addicting. Though using smokeless tobacco is lessharmful than smoking cigarettes, it can cause serious health problems, such as:   White patches or red sores in your mouth that can turn into mouth cancer involving the lip, tongue, or cheek.  Tooth loss and other dental problems.  Gum disease. Your gums may pull away from your teeth and not grow back. People who use smokeless tobacco crave the nicotine in it. Giving up smokeless tobacco is much harder than simply changing a habit. Your body has to stop craving the nicotine. It is hard to quit, but you can do it. Many tools are available for people who want to quit using smokeless tobacco. You may findthat combining tools works best for you. There are several steps to quitting. First you get ready to quit. Then you get support to help you. After that, you learn new skills and behaviors to quit. For many people, a necessary step is getting and using medicine. Your doctor will help you set up the plan that best meets your needs. You may want to attend a tobacco cessation program. When you choose a program, look for one that has proven success. Ask your doctor for ideas.  You will greatly increase your chances of success if you take medicine as well as get counselingor join a cessation program.  Some of the changes you feel

## 2022-07-27 DIAGNOSIS — E11.9 TYPE 2 DIABETES MELLITUS WITHOUT COMPLICATION, WITHOUT LONG-TERM CURRENT USE OF INSULIN (HCC): ICD-10-CM

## 2022-07-27 RX ORDER — DULAGLUTIDE 1.5 MG/.5ML
1.5 INJECTION, SOLUTION SUBCUTANEOUS WEEKLY
Qty: 4 ML | Refills: 3 | Status: SHIPPED | OUTPATIENT
Start: 2022-07-27

## 2022-12-07 DIAGNOSIS — E11.9 TYPE 2 DIABETES MELLITUS WITHOUT COMPLICATION, WITHOUT LONG-TERM CURRENT USE OF INSULIN (HCC): ICD-10-CM

## 2022-12-07 RX ORDER — DULAGLUTIDE 1.5 MG/.5ML
1.5 INJECTION, SOLUTION SUBCUTANEOUS WEEKLY
Qty: 4 ML | Refills: 3 | Status: SHIPPED | OUTPATIENT
Start: 2022-12-07

## 2023-02-02 ENCOUNTER — OFFICE VISIT (OUTPATIENT)
Dept: FAMILY MEDICINE CLINIC | Age: 53
End: 2023-02-02
Payer: COMMERCIAL

## 2023-02-02 VITALS
OXYGEN SATURATION: 95 % | SYSTOLIC BLOOD PRESSURE: 148 MMHG | WEIGHT: 295 LBS | DIASTOLIC BLOOD PRESSURE: 92 MMHG | HEART RATE: 76 BPM | BODY MASS INDEX: 44.85 KG/M2

## 2023-02-02 DIAGNOSIS — M54.42 MIDLINE LOW BACK PAIN WITH BILATERAL SCIATICA, UNSPECIFIED CHRONICITY: ICD-10-CM

## 2023-02-02 DIAGNOSIS — I10 ESSENTIAL (PRIMARY) HYPERTENSION: ICD-10-CM

## 2023-02-02 DIAGNOSIS — F41.9 ANXIETY AND DEPRESSION: ICD-10-CM

## 2023-02-02 DIAGNOSIS — E78.2 MIXED HYPERLIPIDEMIA: ICD-10-CM

## 2023-02-02 DIAGNOSIS — M54.41 MIDLINE LOW BACK PAIN WITH BILATERAL SCIATICA, UNSPECIFIED CHRONICITY: ICD-10-CM

## 2023-02-02 DIAGNOSIS — E11.9 TYPE 2 DIABETES MELLITUS WITHOUT COMPLICATION, WITHOUT LONG-TERM CURRENT USE OF INSULIN (HCC): Primary | ICD-10-CM

## 2023-02-02 DIAGNOSIS — F32.A ANXIETY AND DEPRESSION: ICD-10-CM

## 2023-02-02 PROCEDURE — 3077F SYST BP >= 140 MM HG: CPT

## 2023-02-02 PROCEDURE — 4004F PT TOBACCO SCREEN RCVD TLK: CPT

## 2023-02-02 PROCEDURE — G8417 CALC BMI ABV UP PARAM F/U: HCPCS

## 2023-02-02 PROCEDURE — G8484 FLU IMMUNIZE NO ADMIN: HCPCS

## 2023-02-02 PROCEDURE — 2022F DILAT RTA XM EVC RTNOPTHY: CPT

## 2023-02-02 PROCEDURE — 99214 OFFICE O/P EST MOD 30 MIN: CPT

## 2023-02-02 PROCEDURE — 3017F COLORECTAL CA SCREEN DOC REV: CPT

## 2023-02-02 PROCEDURE — 3046F HEMOGLOBIN A1C LEVEL >9.0%: CPT

## 2023-02-02 PROCEDURE — 3080F DIAST BP >= 90 MM HG: CPT

## 2023-02-02 PROCEDURE — G8427 DOCREV CUR MEDS BY ELIG CLIN: HCPCS

## 2023-02-02 RX ORDER — LISINOPRIL 10 MG/1
10 TABLET ORAL DAILY
Qty: 30 TABLET | Refills: 3 | Status: SHIPPED | OUTPATIENT
Start: 2023-02-02

## 2023-02-02 ASSESSMENT — ENCOUNTER SYMPTOMS
RESPIRATORY NEGATIVE: 1
GASTROINTESTINAL NEGATIVE: 1

## 2023-02-02 NOTE — PROGRESS NOTES
Chief Complaint   Patient presents with    Hypertension    Diabetes       HPI:  Marshall Aguilar is a 46 y.o. (: 1970) here today   for routine office visit. Diabetes: On Trulicity 1.5 mg weekly, glipizide 5 mg twice daily, metformin 1000 mg twice daily. Hypertension: BP elevated today with recheck of 140/92. Currently taking hydrochlorothiazide 12.5 mg daily. Anxiety and depression: Doing very well at this time with his anxiety depression symptoms. Is on Lexapro 10 mg daily. Hyperlipidemia: On Lipitor 40 mg daily. Chronic low back pain:  gets significant pain in bilateral lower extremities particularly when he lifts his legs up in recliner. Sometimes get up off the floor is extreme difficult and almost bring tears to his eyes due to significant mount of pain he is experiencing.  has chronic this disease in his lumbar spine. Do not see imaging studies in [facility]. Patient  had x-ray in the past through [facility].      Patient's medications, allergies, past medical, surgical, social and family histories were reviewed and updated as appropriate. ROS:  Review of Systems   Constitutional: Negative. HENT: Negative. Respiratory: Negative. Cardiovascular: Negative. Gastrointestinal: Negative. Musculoskeletal:  Positive for arthralgias. Neurological:  Positive for numbness. Psychiatric/Behavioral: Negative.            Hemoglobin A1C (%)   Date Value   2022 8.9     Microalbumin, Random Urine (mg/dL)   Date Value   2022 <1.20     LDL Calculated (mg/dL)   Date Value   2022 58       Past Medical History:   Diagnosis Date    Coronary artery disease involving native heart without angina pectoris     Gastro-esophageal reflux disease without esophagitis 10/24/2017    Mixed hyperglyceridemia     Nonrheumatic tricuspid valve regurgitation     Sleep apnea     TIA (transient ischemic attack)     Tobacco use 10/24/2017       Family History Problem Relation Age of Onset    Diabetes Mother     High Blood Pressure Mother     Heart Disease Mother     Heart Disease Father     COPD Father     High Blood Pressure Father     Diabetes Sister        Social History     Socioeconomic History    Marital status:      Spouse name: Not on file    Number of children: Not on file    Years of education: Not on file    Highest education level: Not on file   Occupational History    Not on file   Tobacco Use    Smoking status: Never    Smokeless tobacco: Current     Types: Snuff   Vaping Use    Vaping Use: Never used   Substance and Sexual Activity    Alcohol use: Never    Drug use: Never    Sexual activity: Not on file   Other Topics Concern    Not on file   Social History Narrative    Not on file     Social Determinants of Health     Financial Resource Strain: Low Risk     Difficulty of Paying Living Expenses: Not hard at all   Food Insecurity: No Food Insecurity    Worried About Running Out of Food in the Last Year: Never true    Ran Out of Food in the Last Year: Never true   Transportation Needs: Not on file   Physical Activity: Not on file   Stress: Not on file   Social Connections: Not on file   Intimate Partner Violence: Not on file   Housing Stability: Not on file       Prior to Visit Medications    Medication Sig Taking?  Authorizing Provider   lisinopril (PRINIVIL;ZESTRIL) 10 MG tablet Take 1 tablet by mouth daily Yes HOWARD Kaplan CNP   dulaglutide (TRULICITY) 1.5 VALE/2.9UQ SC injection Inject 0.5 mLs into the skin once a week Yes Juli Rocha MD   pantoprazole (PROTONIX) 20 MG tablet Take 1 tablet by mouth in the morning and at bedtime Yes HOWARD Kaplan CNP   escitalopram (LEXAPRO) 10 MG tablet Take 1 tablet by mouth daily Yes HOWARD Kaplan CNP   atorvastatin (LIPITOR) 40 MG tablet Take 1 tablet by mouth daily Yes HOWARD Kaplan CNP   glipiZIDE (GLUCOTROL) 5 MG tablet Take 1 tablet by mouth 2 times daily (before meals) Yes HOWARD Giron CNP   hydroCHLOROthiazide (MICROZIDE) 12.5 MG capsule Take 1 capsule by mouth every morning Yes HOWARD Giron CNP   metFORMIN (GLUCOPHAGE-XR) 500 MG extended release tablet Take 2 tablets by mouth 2 times daily Yes HOWARD Giron CNP   gabapentin (NEURONTIN) 300 MG capsule  Yes Historical Provider, MD   aspirin 81 MG EC tablet Take 81 mg by mouth daily Yes Historical Provider, MD       No Known Allergies    OBJECTIVE:    BP (!) 148/92 (Site: Left Upper Arm, Position: Sitting)   Pulse 76   Wt 295 lb (133.8 kg)   SpO2 95%   BMI 44.85 kg/m²     BP Readings from Last 2 Encounters:   02/02/23 (!) 148/92   06/16/22 122/76       Wt Readings from Last 3 Encounters:   02/02/23 295 lb (133.8 kg)   06/16/22 279 lb 3.2 oz (126.6 kg)   05/12/22 279 lb (126.6 kg)       Physical Exam  Constitutional:       Appearance: Normal appearance. Cardiovascular:      Rate and Rhythm: Normal rate and regular rhythm. Pulses: Normal pulses. Heart sounds: Normal heart sounds. No murmur heard. Pulmonary:      Effort: Pulmonary effort is normal.      Breath sounds: Normal breath sounds. No wheezing. Abdominal:      General: Bowel sounds are normal.   Musculoskeletal:      Right lower leg: No edema. Left lower leg: No edema. Skin:     General: Skin is warm. Neurological:      General: No focal deficit present. Mental Status: He is alert and oriented to person, place, and time. Psychiatric:         Mood and Affect: Mood normal.         Behavior: Behavior normal.         ASSESSMENT/PLAN:    1. Type 2 diabetes mellitus without complication, without long-term current use of insulin ([facility] [address]  Repeat labs ordered today. For the time being we will continue on current medication regimen. Do suspect worsening A1c due to patient gaining roughly 16 pounds since our last office visit. - Hemoglobin A1C; Future  - Comprehensive Metabolic Panel;  Future  - CBC with Auto Differential; Future  - LIPID PANEL; Future    2. Essential (primary) hypertension  BP elevated today at 140/92. Patient has not been monitoring BP at home. Has slacked off on his diet therefore sodium intake has been greater. I am going to add lisinopril 10 mg daily to the patient med regimen. Patient will come back in 3 weeks for nurse visit BP check. - Comprehensive Metabolic Panel; Future  - CBC with Auto Differential; Future  - lisinopril (PRINIVIL;ZESTRIL) 10 MG tablet; Take 1 tablet by mouth daily  Dispense: 30 tablet; Refill: 3    3. Anxiety and depression  Patient doing very well at this time managing symptoms on current treatment plan. No changes at this time. 4. Mixed hyperlipidemia  Repeat labs ordered today. For the time being continue on current medication regimen as ordered. - Comprehensive Metabolic Panel; Future  - LIPID PANEL; Future    5. Midline low back pain with bilateral sciatica, unspecified chronicity  See above HPI. We will order MRI of the lumbar spine without contrast.  Pending MRI result will most likely refer to spine specialist Jermaine Dial. We will await imaging study result. Discussed the possibility of Medrol Dosepak pending A1c result. If A1c elevated will hold off on Medrol Dosepak. - MRI LUMBAR SPINE WO CONTRAST; Future    35 minutes spent on patient care today. This document was prepared by a combination of typing and transcription through a voice recognition software.     HOWARD Del Toro - CNP

## 2023-02-04 LAB
A/G RATIO: 1.3 G/DL (ref 1–2.5)
ALBUMIN SERPL-MCNC: 4.5 G/DL (ref 3.5–5)
ALP BLD-CCNC: 88 U/L (ref 38–126)
ALT SERPL-CCNC: 30 U/L (ref 0–49)
ANION GAP SERPL CALCULATED.3IONS-SCNC: 11.4 MMOL/L (ref 8–16)
AST SERPL-CCNC: 30 U/L (ref 17–59)
BASOPHILS RELATIVE PERCENT: 0.6 % (ref 0–1)
BILIRUB SERPL-MCNC: 0.9 MG/DL (ref 0.2–1.3)
BUN BLDV-MCNC: 17 MG/DL (ref 9–20)
CALCIUM SERPL-MCNC: 9.5 MG/DL (ref 8.6–10.3)
CHLORIDE BLD-SCNC: 99 MMOL/L (ref 98–107)
CHOLESTEROL, TOTAL: 127 MG/DL (ref 0–200)
CO2: 30 MMOL/L (ref 22–30)
CREAT SERPL-MCNC: 0.8 MG/DL (ref 0.66–1.25)
EOSINOPHILS RELATIVE PERCENT: 2.4 % (ref 0–5)
ERYTHROCYTE DISTRIBUTION WIDTH RBC RATIO: 12.3 % (ref 11.6–14.8)
GFR CALCULATED: 101
GLOBULIN: 3.4 G/DL (ref 2–3.5)
GLUCOSE BLD-MCNC: 239 MG/DL (ref 74–100)
GRANULOCYTE ABSOLUTE COUNT: 6.8 X(10)3/UL (ref 1.8–7.2)
HCT VFR BLD CALC: 47.2 % (ref 37.4–53.8)
HDLC SERPL-MCNC: 30 MG/DL (ref 40–59)
HEMOGLOBIN: 15.7 G/DL (ref 13.2–16.5)
IMMATURE GRANULOCYTES %: 0.3 % (ref 0–0.5)
LDL CHOLESTEROL DIRECT: 67.4 MG/DL
LDL/HDL RATIO: 2.2
LYMPHOCYTES ABSOLUTE: 2.8 X(10)3/UL (ref 1.1–2.7)
LYMPHOCYTES RELATIVE PERCENT: 26.1 % (ref 15–45)
MCH RBC QN AUTO: 29.2 PG (ref 27.7–33.3)
MCHC RBC AUTO-ENTMCNC: 33.3 G/DL (ref 32.8–35)
MCV RBC AUTO: 87.7 FL (ref 80.5–96.9)
MONOCYTES RELATIVE PERCENT: 7.4 % (ref 0–12)
NEUTROPHILS/100 LEUKOCYTES: 63.2 % (ref 40–70)
PLATELETS: 283 X1000 (ref 129–332)
POTASSIUM SERPL-SCNC: 4.4 MMOL/L (ref 3.4–5.1)
RBC # BLD: 5.38 X1000000 (ref 4.16–5.62)
SODIUM BLD-SCNC: 136 MMOL/L (ref 137–145)
TOTAL PROTEIN: 7.9 G/DL (ref 6.3–8.2)
TRIGL SERPL-MCNC: 148 MG/DL (ref 0–149)
VLDLC SERPL CALC-MCNC: 29.6 MG/DL (ref 10–50)
WBC # BLD: 10.8 X1000 (ref 5.4–9.9)

## 2023-02-11 DIAGNOSIS — E11.9 TYPE 2 DIABETES MELLITUS WITHOUT COMPLICATION, WITHOUT LONG-TERM CURRENT USE OF INSULIN (HCC): Primary | ICD-10-CM

## 2023-02-11 RX ORDER — DULAGLUTIDE 3 MG/.5ML
3 INJECTION, SOLUTION SUBCUTANEOUS WEEKLY
Qty: 1 ADJUSTABLE DOSE PRE-FILLED PEN SYRINGE | Refills: 3 | Status: SHIPPED | OUTPATIENT
Start: 2023-02-11

## 2023-07-05 NOTE — PROGRESS NOTES
the time being continue on current treatment plan as ordered. - Hemoglobin A1C  - Comprehensive Metabolic Panel  - LIPID PANEL  -  DIABETES FOOT EXAM  - MICROALBUMIN / CREATININE URINE RATIO  - Dulaglutide (TRULICITY) 3 VC/3.3GS SOPN; Inject 3 mg into the skin once a week  Dispense: 12 Adjustable Dose Pre-filled Pen Syringe; Refill: 3  - glipiZIDE (GLUCOTROL) 5 MG tablet; Take 1 tablet by mouth 2 times daily (before meals)  Dispense: 180 tablet; Refill: 3  - metFORMIN (GLUCOPHAGE-XR) 500 MG extended release tablet; Take 2 tablets by mouth 2 times daily  Dispense: 360 tablet; Refill: 3  - aspirin 81 MG EC tablet; Take 1 tablet by mouth daily  Dispense: 90 tablet; Refill: 3  - Vitamin B12 & Folate    2. Nausea and vomiting, unspecified vomiting type  Patient exhibited nausea and vomiting symptoms when he arrived in office today. States has felt fine all day until he arrived in office today. Patient was diaphoretic and had a large amount of vomiting for several minutes. After vomiting started patient did have improvement in symptoms in regard to how he felt. Has not been monitoring blood sugar. Denies any ill contacts recent. Uncertain etiology at this time. Given acute onset we will continue to monitor for the time being and recheck labs ordered today. 3. Essential (primary) hypertension  BP elevated today lisinopril increased to 20 mg daily. Hydrochlorothiazide refilled today also. Repeat labs ordered today. Follow-up in 2 to 3 weeks for hypertension. Continue following cardiology through [facility].  - Comprehensive Metabolic Panel  - LIPID PANEL  - hydroCHLOROthiazide (MICROZIDE) 12.5 MG capsule; Take 1 capsule by mouth every morning  Dispense: 90 capsule; Refill: 3  - lisinopril (PRINIVIL;ZESTRIL) 20 MG tablet; Take 1 tablet by mouth daily  Dispense: 30 tablet; Refill: 3  - CBC with Auto Differential    4. TIA (transient ischemic attack)  Repeat labs today.   Medication refill

## 2023-07-06 ENCOUNTER — OFFICE VISIT (OUTPATIENT)
Dept: FAMILY MEDICINE CLINIC | Age: 53
End: 2023-07-06
Payer: COMMERCIAL

## 2023-07-06 VITALS
BODY MASS INDEX: 45.92 KG/M2 | DIASTOLIC BLOOD PRESSURE: 88 MMHG | HEART RATE: 89 BPM | WEIGHT: 302 LBS | OXYGEN SATURATION: 95 % | SYSTOLIC BLOOD PRESSURE: 148 MMHG

## 2023-07-06 DIAGNOSIS — E11.9 TYPE 2 DIABETES MELLITUS WITHOUT COMPLICATION, WITHOUT LONG-TERM CURRENT USE OF INSULIN (HCC): Primary | ICD-10-CM

## 2023-07-06 DIAGNOSIS — Z12.11 COLON CANCER SCREENING: ICD-10-CM

## 2023-07-06 DIAGNOSIS — E78.2 MIXED HYPERLIPIDEMIA: ICD-10-CM

## 2023-07-06 DIAGNOSIS — F32.A ANXIETY AND DEPRESSION: ICD-10-CM

## 2023-07-06 DIAGNOSIS — I10 ESSENTIAL (PRIMARY) HYPERTENSION: ICD-10-CM

## 2023-07-06 DIAGNOSIS — G45.9 TIA (TRANSIENT ISCHEMIC ATTACK): ICD-10-CM

## 2023-07-06 DIAGNOSIS — K21.9 GASTROESOPHAGEAL REFLUX DISEASE, UNSPECIFIED WHETHER ESOPHAGITIS PRESENT: ICD-10-CM

## 2023-07-06 DIAGNOSIS — F41.9 ANXIETY AND DEPRESSION: ICD-10-CM

## 2023-07-06 DIAGNOSIS — R11.2 NAUSEA AND VOMITING, UNSPECIFIED VOMITING TYPE: ICD-10-CM

## 2023-07-06 DIAGNOSIS — I25.10 CORONARY ARTERY DISEASE INVOLVING NATIVE CORONARY ARTERY OF NATIVE HEART WITHOUT ANGINA PECTORIS: ICD-10-CM

## 2023-07-06 PROCEDURE — 3077F SYST BP >= 140 MM HG: CPT

## 2023-07-06 PROCEDURE — G8427 DOCREV CUR MEDS BY ELIG CLIN: HCPCS

## 2023-07-06 PROCEDURE — 3017F COLORECTAL CA SCREEN DOC REV: CPT

## 2023-07-06 PROCEDURE — 3079F DIAST BP 80-89 MM HG: CPT

## 2023-07-06 PROCEDURE — 3046F HEMOGLOBIN A1C LEVEL >9.0%: CPT

## 2023-07-06 PROCEDURE — 99215 OFFICE O/P EST HI 40 MIN: CPT

## 2023-07-06 PROCEDURE — 2022F DILAT RTA XM EVC RTNOPTHY: CPT

## 2023-07-06 PROCEDURE — G8417 CALC BMI ABV UP PARAM F/U: HCPCS

## 2023-07-06 PROCEDURE — 4004F PT TOBACCO SCREEN RCVD TLK: CPT

## 2023-07-06 PROCEDURE — 36415 COLL VENOUS BLD VENIPUNCTURE: CPT

## 2023-07-06 RX ORDER — METFORMIN HYDROCHLORIDE 500 MG/1
1000 TABLET, EXTENDED RELEASE ORAL 2 TIMES DAILY
Qty: 360 TABLET | Refills: 3 | Status: SHIPPED | OUTPATIENT
Start: 2023-07-06 | End: 2024-06-30

## 2023-07-06 RX ORDER — LISINOPRIL 10 MG/1
20 TABLET ORAL DAILY
Qty: 90 TABLET | Refills: 3 | Status: SHIPPED | OUTPATIENT
Start: 2023-07-06 | End: 2023-07-06

## 2023-07-06 RX ORDER — DULAGLUTIDE 3 MG/.5ML
3 INJECTION, SOLUTION SUBCUTANEOUS WEEKLY
Qty: 12 ADJUSTABLE DOSE PRE-FILLED PEN SYRINGE | Refills: 3 | Status: SHIPPED | OUTPATIENT
Start: 2023-07-06 | End: 2023-07-07 | Stop reason: DRUGHIGH

## 2023-07-06 RX ORDER — LISINOPRIL 20 MG/1
20 TABLET ORAL DAILY
Qty: 30 TABLET | Refills: 3 | Status: SHIPPED | OUTPATIENT
Start: 2023-07-06

## 2023-07-06 RX ORDER — ASPIRIN 81 MG/1
81 TABLET ORAL DAILY
Qty: 90 TABLET | Refills: 3 | Status: SHIPPED | OUTPATIENT
Start: 2023-07-06

## 2023-07-06 RX ORDER — ATORVASTATIN CALCIUM 40 MG/1
40 TABLET, FILM COATED ORAL DAILY
Qty: 90 TABLET | Refills: 3 | Status: SHIPPED | OUTPATIENT
Start: 2023-07-06

## 2023-07-06 RX ORDER — GLIPIZIDE 5 MG/1
5 TABLET ORAL
Qty: 180 TABLET | Refills: 3 | Status: SHIPPED | OUTPATIENT
Start: 2023-07-06 | End: 2024-06-30

## 2023-07-06 RX ORDER — LISINOPRIL 10 MG/1
10 TABLET ORAL DAILY
Qty: 90 TABLET | Refills: 3 | Status: SHIPPED | OUTPATIENT
Start: 2023-07-06 | End: 2023-07-06

## 2023-07-06 RX ORDER — HYDROCHLOROTHIAZIDE 12.5 MG/1
12.5 CAPSULE, GELATIN COATED ORAL EVERY MORNING
Qty: 90 CAPSULE | Refills: 3 | Status: SHIPPED | OUTPATIENT
Start: 2023-07-06

## 2023-07-06 RX ORDER — PANTOPRAZOLE SODIUM 20 MG/1
20 TABLET, DELAYED RELEASE ORAL 2 TIMES DAILY
Qty: 90 TABLET | Refills: 3 | Status: SHIPPED | OUTPATIENT
Start: 2023-07-06

## 2023-07-06 RX ORDER — ESCITALOPRAM OXALATE 10 MG/1
10 TABLET ORAL DAILY
Qty: 90 TABLET | Refills: 3 | Status: SHIPPED | OUTPATIENT
Start: 2023-07-06

## 2023-07-06 SDOH — ECONOMIC STABILITY: INCOME INSECURITY: HOW HARD IS IT FOR YOU TO PAY FOR THE VERY BASICS LIKE FOOD, HOUSING, MEDICAL CARE, AND HEATING?: SOMEWHAT HARD

## 2023-07-06 SDOH — ECONOMIC STABILITY: FOOD INSECURITY: WITHIN THE PAST 12 MONTHS, THE FOOD YOU BOUGHT JUST DIDN'T LAST AND YOU DIDN'T HAVE MONEY TO GET MORE.: NEVER TRUE

## 2023-07-06 SDOH — ECONOMIC STABILITY: FOOD INSECURITY: WITHIN THE PAST 12 MONTHS, YOU WORRIED THAT YOUR FOOD WOULD RUN OUT BEFORE YOU GOT MONEY TO BUY MORE.: NEVER TRUE

## 2023-07-06 SDOH — ECONOMIC STABILITY: HOUSING INSECURITY
IN THE LAST 12 MONTHS, WAS THERE A TIME WHEN YOU DID NOT HAVE A STEADY PLACE TO SLEEP OR SLEPT IN A SHELTER (INCLUDING NOW)?: NO

## 2023-07-06 ASSESSMENT — PATIENT HEALTH QUESTIONNAIRE - PHQ9
SUM OF ALL RESPONSES TO PHQ9 QUESTIONS 1 & 2: 1
3. TROUBLE FALLING OR STAYING ASLEEP: 3
10. IF YOU CHECKED OFF ANY PROBLEMS, HOW DIFFICULT HAVE THESE PROBLEMS MADE IT FOR YOU TO DO YOUR WORK, TAKE CARE OF THINGS AT HOME, OR GET ALONG WITH OTHER PEOPLE: 0
SUM OF ALL RESPONSES TO PHQ QUESTIONS 1-9: 7
SUM OF ALL RESPONSES TO PHQ QUESTIONS 1-9: 7
1. LITTLE INTEREST OR PLEASURE IN DOING THINGS: 1
9. THOUGHTS THAT YOU WOULD BE BETTER OFF DEAD, OR OF HURTING YOURSELF: 0
6. FEELING BAD ABOUT YOURSELF - OR THAT YOU ARE A FAILURE OR HAVE LET YOURSELF OR YOUR FAMILY DOWN: 0
SUM OF ALL RESPONSES TO PHQ QUESTIONS 1-9: 7
5. POOR APPETITE OR OVEREATING: 0
4. FEELING TIRED OR HAVING LITTLE ENERGY: 3
8. MOVING OR SPEAKING SO SLOWLY THAT OTHER PEOPLE COULD HAVE NOTICED. OR THE OPPOSITE, BEING SO FIGETY OR RESTLESS THAT YOU HAVE BEEN MOVING AROUND A LOT MORE THAN USUAL: 0
SUM OF ALL RESPONSES TO PHQ QUESTIONS 1-9: 7
2. FEELING DOWN, DEPRESSED OR HOPELESS: 0
7. TROUBLE CONCENTRATING ON THINGS, SUCH AS READING THE NEWSPAPER OR WATCHING TELEVISION: 0

## 2023-07-06 ASSESSMENT — ENCOUNTER SYMPTOMS
RESPIRATORY NEGATIVE: 1
NAUSEA: 1
ABDOMINAL DISTENTION: 0
VOMITING: 1

## 2023-07-07 DIAGNOSIS — D72.828 OTHER ELEVATED WHITE BLOOD CELL (WBC) COUNT: Primary | ICD-10-CM

## 2023-07-07 DIAGNOSIS — E11.9 TYPE 2 DIABETES MELLITUS WITHOUT COMPLICATION, WITHOUT LONG-TERM CURRENT USE OF INSULIN (HCC): ICD-10-CM

## 2023-07-07 LAB
ALBUMIN SERPL-MCNC: 4.5 G/DL (ref 3.4–5)
ALBUMIN/GLOB SERPL: 1.3 {RATIO} (ref 1.1–2.2)
ALP SERPL-CCNC: 90 U/L (ref 40–129)
ALT SERPL-CCNC: 24 U/L (ref 10–40)
ANION GAP SERPL CALCULATED.3IONS-SCNC: 17 MMOL/L (ref 3–16)
AST SERPL-CCNC: 17 U/L (ref 15–37)
BASOPHILS # BLD: 0.1 K/UL (ref 0–0.2)
BASOPHILS NFR BLD: 0.4 %
BILIRUB SERPL-MCNC: 0.3 MG/DL (ref 0–1)
BUN SERPL-MCNC: 19 MG/DL (ref 7–20)
CALCIUM SERPL-MCNC: 10.5 MG/DL (ref 8.3–10.6)
CHLORIDE SERPL-SCNC: 95 MMOL/L (ref 99–110)
CHOLEST SERPL-MCNC: 217 MG/DL (ref 0–199)
CO2 SERPL-SCNC: 24 MMOL/L (ref 21–32)
CREAT SERPL-MCNC: 0.9 MG/DL (ref 0.9–1.3)
DEPRECATED RDW RBC AUTO: 13.5 % (ref 12.4–15.4)
EOSINOPHIL # BLD: 0.1 K/UL (ref 0–0.6)
EOSINOPHIL NFR BLD: 1.1 %
EST. AVERAGE GLUCOSE BLD GHB EST-MCNC: 208.7 MG/DL
FOLATE SERPL-MCNC: 9.47 NG/ML (ref 4.78–24.2)
GFR SERPLBLD CREATININE-BSD FMLA CKD-EPI: >60 ML/MIN/{1.73_M2}
GLUCOSE SERPL-MCNC: 188 MG/DL (ref 70–99)
HBA1C MFR BLD: 8.9 %
HCT VFR BLD AUTO: 45.9 % (ref 40.5–52.5)
HDLC SERPL-MCNC: 35 MG/DL (ref 40–60)
HGB BLD-MCNC: 15.5 G/DL (ref 13.5–17.5)
LDLC SERPL CALC-MCNC: ABNORMAL MG/DL
LDLC SERPL-MCNC: 140 MG/DL
LYMPHOCYTES # BLD: 3.7 K/UL (ref 1–5.1)
LYMPHOCYTES NFR BLD: 27.1 %
MCH RBC QN AUTO: 29.9 PG (ref 26–34)
MCHC RBC AUTO-ENTMCNC: 33.7 G/DL (ref 31–36)
MCV RBC AUTO: 88.7 FL (ref 80–100)
MONOCYTES # BLD: 0.6 K/UL (ref 0–1.3)
MONOCYTES NFR BLD: 4.7 %
NEUTROPHILS # BLD: 9.1 K/UL (ref 1.7–7.7)
NEUTROPHILS NFR BLD: 66.7 %
PLATELET # BLD AUTO: 354 K/UL (ref 135–450)
PMV BLD AUTO: 9 FL (ref 5–10.5)
POTASSIUM SERPL-SCNC: 4.5 MMOL/L (ref 3.5–5.1)
PROT SERPL-MCNC: 7.9 G/DL (ref 6.4–8.2)
RBC # BLD AUTO: 5.18 M/UL (ref 4.2–5.9)
SODIUM SERPL-SCNC: 136 MMOL/L (ref 136–145)
TRIGL SERPL-MCNC: 322 MG/DL (ref 0–150)
VIT B12 SERPL-MCNC: 736 PG/ML (ref 211–911)
VLDLC SERPL CALC-MCNC: ABNORMAL MG/DL
WBC # BLD AUTO: 13.7 K/UL (ref 4–11)

## 2023-07-07 RX ORDER — DULAGLUTIDE 4.5 MG/.5ML
4.5 INJECTION, SOLUTION SUBCUTANEOUS WEEKLY
Qty: 1 ADJUSTABLE DOSE PRE-FILLED PEN SYRINGE | Refills: 5 | Status: SHIPPED | OUTPATIENT
Start: 2023-07-07

## 2023-07-07 RX ORDER — AMOXICILLIN AND CLAVULANATE POTASSIUM 875; 125 MG/1; MG/1
1 TABLET, FILM COATED ORAL 2 TIMES DAILY
Qty: 20 TABLET | Refills: 0 | Status: SHIPPED | OUTPATIENT
Start: 2023-07-07 | End: 2023-07-17

## 2023-07-12 DIAGNOSIS — E78.5 ELEVATED LIPIDS: Primary | ICD-10-CM

## 2023-07-12 DIAGNOSIS — E78.2 MIXED HYPERLIPIDEMIA: ICD-10-CM

## 2023-07-12 RX ORDER — ATORVASTATIN CALCIUM 20 MG/1
20 TABLET, FILM COATED ORAL DAILY
Qty: 90 TABLET | Refills: 1 | Status: SHIPPED | OUTPATIENT
Start: 2023-07-12

## 2023-07-13 ENCOUNTER — TELEPHONE (OUTPATIENT)
Dept: FAMILY MEDICINE CLINIC | Age: 53
End: 2023-07-13

## 2023-07-13 DIAGNOSIS — R11.2 NAUSEA AND VOMITING, UNSPECIFIED VOMITING TYPE: Primary | ICD-10-CM

## 2023-07-13 DIAGNOSIS — R11.2 NAUSEA AND VOMITING, UNSPECIFIED VOMITING TYPE: ICD-10-CM

## 2023-07-13 RX ORDER — ONDANSETRON 4 MG/1
4 TABLET, ORALLY DISINTEGRATING ORAL 3 TIMES DAILY PRN
Qty: 21 TABLET | Refills: 0 | Status: SHIPPED | OUTPATIENT
Start: 2023-07-13

## 2023-07-13 NOTE — TELEPHONE ENCOUNTER
Would recommend starting the antibiotic if can tolerate. If cannot tolerate her symptoms are worsening may be seen in the ED. Patient had a fair elevation in his WBCs. I can also send Zofran to his pharmacy to try and alleviate the nausea symptoms and hopefully can manage antibiotics as well as stay hydrated.

## 2023-07-13 NOTE — TELEPHONE ENCOUNTER
Pt called and stated he is still vomiting and is feeling bad, he is wondering what to do, it is going on 7 days of being sick. However he has not started the antibiotic due to working late and not being able to get to the pharmacy. He did say he would be going to pick it up today so he is wondering if he should be seen in office again or if he should start the antibiotics and give those a few days?

## 2023-08-23 LAB
CREAT SERPL-MCNC: 0.1 MG/DL
POTASSIUM (K+): 4.2

## 2023-08-24 DIAGNOSIS — I10 ESSENTIAL HYPERTENSION: Primary | ICD-10-CM

## 2023-08-24 DIAGNOSIS — I10 ESSENTIAL HYPERTENSION: ICD-10-CM

## 2023-08-29 ENCOUNTER — OFFICE VISIT (OUTPATIENT)
Dept: FAMILY MEDICINE CLINIC | Age: 53
End: 2023-08-29
Payer: COMMERCIAL

## 2023-08-29 VITALS
HEART RATE: 82 BPM | BODY MASS INDEX: 44.7 KG/M2 | OXYGEN SATURATION: 97 % | SYSTOLIC BLOOD PRESSURE: 154 MMHG | WEIGHT: 294 LBS | DIASTOLIC BLOOD PRESSURE: 94 MMHG

## 2023-08-29 DIAGNOSIS — K31.84 GASTROPARESIS: ICD-10-CM

## 2023-08-29 DIAGNOSIS — Z12.11 COLON CANCER SCREENING: ICD-10-CM

## 2023-08-29 DIAGNOSIS — R19.7 DIARRHEA, UNSPECIFIED TYPE: ICD-10-CM

## 2023-08-29 DIAGNOSIS — Z09 HOSPITAL DISCHARGE FOLLOW-UP: Primary | ICD-10-CM

## 2023-08-29 DIAGNOSIS — R11.2 NAUSEA AND VOMITING, UNSPECIFIED VOMITING TYPE: ICD-10-CM

## 2023-08-29 DIAGNOSIS — Z95.5 H/O HEART ARTERY STENT: ICD-10-CM

## 2023-08-29 DIAGNOSIS — I10 ESSENTIAL (PRIMARY) HYPERTENSION: ICD-10-CM

## 2023-08-29 DIAGNOSIS — I25.10 CORONARY ARTERY DISEASE INVOLVING NATIVE CORONARY ARTERY OF NATIVE HEART WITHOUT ANGINA PECTORIS: ICD-10-CM

## 2023-08-29 DIAGNOSIS — K62.5 RECTAL BLEEDING: ICD-10-CM

## 2023-08-29 PROCEDURE — G8417 CALC BMI ABV UP PARAM F/U: HCPCS

## 2023-08-29 PROCEDURE — 3077F SYST BP >= 140 MM HG: CPT

## 2023-08-29 PROCEDURE — 4004F PT TOBACCO SCREEN RCVD TLK: CPT

## 2023-08-29 PROCEDURE — 3080F DIAST BP >= 90 MM HG: CPT

## 2023-08-29 PROCEDURE — 99215 OFFICE O/P EST HI 40 MIN: CPT

## 2023-08-29 PROCEDURE — 3017F COLORECTAL CA SCREEN DOC REV: CPT

## 2023-08-29 PROCEDURE — G8427 DOCREV CUR MEDS BY ELIG CLIN: HCPCS

## 2023-08-29 RX ORDER — ONDANSETRON 4 MG/1
4 TABLET, ORALLY DISINTEGRATING ORAL 3 TIMES DAILY PRN
Qty: 21 TABLET | Refills: 0 | Status: SHIPPED | OUTPATIENT
Start: 2023-08-29

## 2023-08-29 ASSESSMENT — ENCOUNTER SYMPTOMS
CONSTIPATION: 0
DIARRHEA: 0
RECTAL PAIN: 0
NAUSEA: 1
ABDOMINAL PAIN: 0
VOMITING: 1
BLOOD IN STOOL: 1
ABDOMINAL DISTENTION: 0
RESPIRATORY NEGATIVE: 1

## 2023-08-29 NOTE — PROGRESS NOTES
Chief Complaint   Patient presents with    ED Follow-up     Nausea, vomiting, fatigue, diarrhea        HPI:  Chris Johnson is a 46 y.o. (: 1970) here today   for evaluation of nausea, vomiting, fatigue, diarrhea. Was seen at [facility] ED on 2023. Patient reports no imaging studies were performed. Only had blood drawn. CBC showed slight elevation of WBCs at 13.1 and neutrophils of 8.8. CMP panel had sodium of 137, potassium 4.2.  BUN and creatinine stable. Urinalysis was negative. No confirmed diagnosis was given. Patient was advised to follow-up with PCP. Still with active symptoms. Patient reports has vomiting episodes multiple times a week. Has foul-smelling/tasting burps routinely. Is seeing blood and clots in his stool that is dark-colored blood. Denies abdominal pain but does state has a history of diverticulitis. He is on metformin and GLP-1 Trulicity well as baby aspirin for history of cardiac stents. Denies taking any NSAIDs or drinking any alcohol routinely or anytime recently. When last saw me on 2023 had sudden onset of nausea and vomiting. There is no issue all day until our office visit. We elected to continue monitoring at that time. Takes ASA 81mg daily. Has cardiac stents. Does not follow cardio anymore. Is due for colonoscopy. BP elevated today and has not taken BP meds in two days. /94. Patient's medications, allergies, past medical, surgical, social and family histories were reviewed and updated asappropriate. ROS:  Review of Systems   Constitutional:  Positive for fatigue. HENT: Negative. Respiratory: Negative. Cardiovascular: Negative. Gastrointestinal:  Positive for blood in stool, nausea and vomiting. Negative for abdominal distention, abdominal pain, constipation, diarrhea and rectal pain. Genitourinary: Negative. Neurological: Negative. Psychiatric/Behavioral:  The patient is nervous/anxious.

## 2023-09-08 ENCOUNTER — TELEPHONE (OUTPATIENT)
Dept: FAMILY MEDICINE CLINIC | Age: 53
End: 2023-09-08

## 2023-09-08 NOTE — TELEPHONE ENCOUNTER
[address] and wants to know if he still needs a Ct scan done since he had one done in the ER.  From what I see it has been done but let me know

## 2023-09-21 ENCOUNTER — OFFICE VISIT (OUTPATIENT)
Dept: FAMILY MEDICINE CLINIC | Age: 53
End: 2023-09-21
Payer: COMMERCIAL

## 2023-09-21 VITALS
SYSTOLIC BLOOD PRESSURE: 124 MMHG | OXYGEN SATURATION: 96 % | DIASTOLIC BLOOD PRESSURE: 78 MMHG | HEART RATE: 81 BPM | BODY MASS INDEX: 44.25 KG/M2 | WEIGHT: 291 LBS

## 2023-09-21 DIAGNOSIS — R11.2 NAUSEA AND VOMITING, UNSPECIFIED VOMITING TYPE: ICD-10-CM

## 2023-09-21 DIAGNOSIS — K31.84 GASTROPARESIS: ICD-10-CM

## 2023-09-21 DIAGNOSIS — K62.5 RECTAL BLEEDING: ICD-10-CM

## 2023-09-21 DIAGNOSIS — I10 ESSENTIAL (PRIMARY) HYPERTENSION: Primary | ICD-10-CM

## 2023-09-21 PROCEDURE — 3078F DIAST BP <80 MM HG: CPT

## 2023-09-21 PROCEDURE — 4004F PT TOBACCO SCREEN RCVD TLK: CPT

## 2023-09-21 PROCEDURE — G8417 CALC BMI ABV UP PARAM F/U: HCPCS

## 2023-09-21 PROCEDURE — 3017F COLORECTAL CA SCREEN DOC REV: CPT

## 2023-09-21 PROCEDURE — 3074F SYST BP LT 130 MM HG: CPT

## 2023-09-21 PROCEDURE — G8427 DOCREV CUR MEDS BY ELIG CLIN: HCPCS

## 2023-09-21 PROCEDURE — 99214 OFFICE O/P EST MOD 30 MIN: CPT

## 2023-09-21 ASSESSMENT — ENCOUNTER SYMPTOMS
RESPIRATORY NEGATIVE: 1
GASTROINTESTINAL NEGATIVE: 1

## 2023-10-10 ENCOUNTER — COMMUNITY OUTREACH (OUTPATIENT)
Dept: FAMILY MEDICINE CLINIC | Age: 53
End: 2023-10-10

## 2023-12-07 ENCOUNTER — OFFICE VISIT (OUTPATIENT)
Dept: FAMILY MEDICINE CLINIC | Age: 53
End: 2023-12-07
Payer: COMMERCIAL

## 2023-12-07 VITALS
OXYGEN SATURATION: 96 % | BODY MASS INDEX: 43.33 KG/M2 | DIASTOLIC BLOOD PRESSURE: 88 MMHG | SYSTOLIC BLOOD PRESSURE: 138 MMHG | HEART RATE: 74 BPM | WEIGHT: 285 LBS

## 2023-12-07 DIAGNOSIS — I10 ESSENTIAL (PRIMARY) HYPERTENSION: ICD-10-CM

## 2023-12-07 DIAGNOSIS — E78.2 MIXED HYPERLIPIDEMIA: ICD-10-CM

## 2023-12-07 DIAGNOSIS — E11.9 TYPE 2 DIABETES MELLITUS WITHOUT COMPLICATION, WITHOUT LONG-TERM CURRENT USE OF INSULIN (HCC): ICD-10-CM

## 2023-12-07 DIAGNOSIS — G45.9 TIA (TRANSIENT ISCHEMIC ATTACK): ICD-10-CM

## 2023-12-07 DIAGNOSIS — I25.10 CORONARY ARTERY DISEASE INVOLVING NATIVE CORONARY ARTERY OF NATIVE HEART WITHOUT ANGINA PECTORIS: ICD-10-CM

## 2023-12-07 DIAGNOSIS — R11.2 NAUSEA AND VOMITING, UNSPECIFIED VOMITING TYPE: Primary | ICD-10-CM

## 2023-12-07 DIAGNOSIS — K21.9 GASTROESOPHAGEAL REFLUX DISEASE, UNSPECIFIED WHETHER ESOPHAGITIS PRESENT: ICD-10-CM

## 2023-12-07 PROCEDURE — 3052F HG A1C>EQUAL 8.0%<EQUAL 9.0%: CPT

## 2023-12-07 PROCEDURE — 2022F DILAT RTA XM EVC RTNOPTHY: CPT

## 2023-12-07 PROCEDURE — 3017F COLORECTAL CA SCREEN DOC REV: CPT

## 2023-12-07 PROCEDURE — 3075F SYST BP GE 130 - 139MM HG: CPT

## 2023-12-07 PROCEDURE — 99215 OFFICE O/P EST HI 40 MIN: CPT

## 2023-12-07 PROCEDURE — 4004F PT TOBACCO SCREEN RCVD TLK: CPT

## 2023-12-07 PROCEDURE — G8427 DOCREV CUR MEDS BY ELIG CLIN: HCPCS

## 2023-12-07 PROCEDURE — 3079F DIAST BP 80-89 MM HG: CPT

## 2023-12-07 PROCEDURE — G8484 FLU IMMUNIZE NO ADMIN: HCPCS

## 2023-12-07 PROCEDURE — G8417 CALC BMI ABV UP PARAM F/U: HCPCS

## 2023-12-07 PROCEDURE — 36415 COLL VENOUS BLD VENIPUNCTURE: CPT

## 2023-12-07 RX ORDER — PANTOPRAZOLE SODIUM 40 MG/1
40 TABLET, DELAYED RELEASE ORAL 2 TIMES DAILY
Qty: 60 TABLET | Refills: 3 | Status: SHIPPED | OUTPATIENT
Start: 2023-12-07

## 2023-12-07 ASSESSMENT — ENCOUNTER SYMPTOMS
VOMITING: 1
RESPIRATORY NEGATIVE: 1
NAUSEA: 1
DIARRHEA: 1

## 2023-12-07 NOTE — PROGRESS NOTES
sounds. No murmur heard. Pulmonary:      Effort: Pulmonary effort is normal.      Breath sounds: Normal breath sounds. Abdominal:      General: Bowel sounds are normal.   Musculoskeletal:         General: Normal range of motion. Right lower leg: No edema. Left lower leg: No edema. Skin:     General: Skin is warm. Capillary Refill: Capillary refill takes less than 2 seconds. Neurological:      General: No focal deficit present. Mental Status: He is alert and oriented to person, place, and time. Psychiatric:         Mood and Affect: Mood normal.         Behavior: Behavior normal.           ASSESSMENT/PLAN:    1. Nausea and vomiting, unspecified vomiting type  See above HPI for symptoms and onset. Patient actively following GI. At this time for my and I am going to discontinue metformin. I explained the patient I suspect his blood sugars will be fairly elevated given the fact he stopped medication x 1 month and reports excess urination which is very common with elevated glucose. I will await lab results and pending A1c tomorrow morning presumably will increase glipizide to 10 mg twice daily. I increased the patient's Protonix to 40 mg twice daily to limit acid on the stomach to promote testable healing. Recommend the patient avoid NSAIDs as he currently is but will stay on aspirin due to prior TIA. Patient will follow-up in 2 weeks with Dr. Emani Chester. At that time if patient much improved the symptoms we will continue off of metformin and treat accordingly based on his diabetic condition. If no improvement we discussed trialing off of Trulicity and resuming back on metformin. Please ensure you are staying well-hydrated at all times. Would recommend taking OTC probiotic to promote gut health. - Comprehensive Metabolic Panel  - CBC with Auto Differential    2.  Type 2 diabetes mellitus without complication, without long-term current use of insulin ([address])  See above HPI, see above #1

## 2023-12-08 LAB
ALBUMIN SERPL-MCNC: 4.2 G/DL (ref 3.4–5)
ALBUMIN/GLOB SERPL: 1.4 {RATIO} (ref 1.1–2.2)
ALP SERPL-CCNC: 109 U/L (ref 40–129)
ALT SERPL-CCNC: 18 U/L (ref 10–40)
ANION GAP SERPL CALCULATED.3IONS-SCNC: 12 MMOL/L (ref 3–16)
AST SERPL-CCNC: 13 U/L (ref 15–37)
BASOPHILS # BLD: 0.1 K/UL (ref 0–0.2)
BASOPHILS NFR BLD: 0.8 %
BILIRUB SERPL-MCNC: <0.2 MG/DL (ref 0–1)
BUN SERPL-MCNC: 20 MG/DL (ref 7–20)
CALCIUM SERPL-MCNC: 9.5 MG/DL (ref 8.3–10.6)
CHLORIDE SERPL-SCNC: 97 MMOL/L (ref 99–110)
CHOLEST SERPL-MCNC: 168 MG/DL (ref 0–199)
CO2 SERPL-SCNC: 27 MMOL/L (ref 21–32)
CREAT SERPL-MCNC: 0.7 MG/DL (ref 0.9–1.3)
CREAT UR-MCNC: 58.7 MG/DL (ref 39–259)
DEPRECATED RDW RBC AUTO: 13.5 % (ref 12.4–15.4)
EOSINOPHIL # BLD: 0.1 K/UL (ref 0–0.6)
EOSINOPHIL NFR BLD: 1.3 %
EST. AVERAGE GLUCOSE BLD GHB EST-MCNC: 269 MG/DL
GFR SERPLBLD CREATININE-BSD FMLA CKD-EPI: >60 ML/MIN/{1.73_M2}
GLUCOSE SERPL-MCNC: 348 MG/DL (ref 70–99)
HBA1C MFR BLD: 11 %
HCT VFR BLD AUTO: 45 % (ref 40.5–52.5)
HDLC SERPL-MCNC: 32 MG/DL (ref 40–60)
HGB BLD-MCNC: 14.9 G/DL (ref 13.5–17.5)
LDLC SERPL CALC-MCNC: 93 MG/DL
LYMPHOCYTES # BLD: 2.5 K/UL (ref 1–5.1)
LYMPHOCYTES NFR BLD: 28.5 %
MCH RBC QN AUTO: 28.4 PG (ref 26–34)
MCHC RBC AUTO-ENTMCNC: 33.1 G/DL (ref 31–36)
MCV RBC AUTO: 85.7 FL (ref 80–100)
MICROALBUMIN UR DL<=1MG/L-MCNC: <1.2 MG/DL
MICROALBUMIN/CREAT UR: NORMAL MG/G (ref 0–30)
MONOCYTES # BLD: 0.7 K/UL (ref 0–1.3)
MONOCYTES NFR BLD: 8.1 %
NEUTROPHILS # BLD: 5.4 K/UL (ref 1.7–7.7)
NEUTROPHILS NFR BLD: 61.3 %
PLATELET # BLD AUTO: 293 K/UL (ref 135–450)
PMV BLD AUTO: 10 FL (ref 5–10.5)
POTASSIUM SERPL-SCNC: 4.4 MMOL/L (ref 3.5–5.1)
PROT SERPL-MCNC: 7.2 G/DL (ref 6.4–8.2)
RBC # BLD AUTO: 5.25 M/UL (ref 4.2–5.9)
SODIUM SERPL-SCNC: 136 MMOL/L (ref 136–145)
TRIGL SERPL-MCNC: 216 MG/DL (ref 0–150)
VLDLC SERPL CALC-MCNC: 43 MG/DL
WBC # BLD AUTO: 8.8 K/UL (ref 4–11)

## 2023-12-11 DIAGNOSIS — E11.9 TYPE 2 DIABETES MELLITUS WITHOUT COMPLICATION, WITHOUT LONG-TERM CURRENT USE OF INSULIN (HCC): ICD-10-CM

## 2023-12-11 RX ORDER — GLIPIZIDE 10 MG/1
10 TABLET ORAL
Qty: 60 TABLET | Refills: 3 | Status: SHIPPED | OUTPATIENT
Start: 2023-12-11 | End: 2024-04-09

## 2023-12-12 DIAGNOSIS — Z12.11 COLON CANCER SCREENING: Primary | ICD-10-CM

## 2023-12-28 ENCOUNTER — OFFICE VISIT (OUTPATIENT)
Dept: FAMILY MEDICINE CLINIC | Age: 53
End: 2023-12-28
Payer: COMMERCIAL

## 2023-12-28 VITALS
HEIGHT: 68 IN | WEIGHT: 285 LBS | SYSTOLIC BLOOD PRESSURE: 132 MMHG | BODY MASS INDEX: 43.19 KG/M2 | DIASTOLIC BLOOD PRESSURE: 86 MMHG | OXYGEN SATURATION: 94 % | HEART RATE: 86 BPM

## 2023-12-28 DIAGNOSIS — E11.65 TYPE 2 DIABETES MELLITUS WITH HYPERGLYCEMIA, WITHOUT LONG-TERM CURRENT USE OF INSULIN (HCC): Primary | ICD-10-CM

## 2023-12-28 PROCEDURE — 3079F DIAST BP 80-89 MM HG: CPT | Performed by: FAMILY MEDICINE

## 2023-12-28 PROCEDURE — G8484 FLU IMMUNIZE NO ADMIN: HCPCS | Performed by: FAMILY MEDICINE

## 2023-12-28 PROCEDURE — 99214 OFFICE O/P EST MOD 30 MIN: CPT | Performed by: FAMILY MEDICINE

## 2023-12-28 PROCEDURE — G8427 DOCREV CUR MEDS BY ELIG CLIN: HCPCS | Performed by: FAMILY MEDICINE

## 2023-12-28 PROCEDURE — 3017F COLORECTAL CA SCREEN DOC REV: CPT | Performed by: FAMILY MEDICINE

## 2023-12-28 PROCEDURE — 2022F DILAT RTA XM EVC RTNOPTHY: CPT | Performed by: FAMILY MEDICINE

## 2023-12-28 PROCEDURE — 3046F HEMOGLOBIN A1C LEVEL >9.0%: CPT | Performed by: FAMILY MEDICINE

## 2023-12-28 PROCEDURE — G8417 CALC BMI ABV UP PARAM F/U: HCPCS | Performed by: FAMILY MEDICINE

## 2023-12-28 PROCEDURE — 3075F SYST BP GE 130 - 139MM HG: CPT | Performed by: FAMILY MEDICINE

## 2023-12-28 PROCEDURE — 4004F PT TOBACCO SCREEN RCVD TLK: CPT | Performed by: FAMILY MEDICINE

## 2023-12-28 NOTE — PROGRESS NOTES
[facility] PROGRESS NOTE  Patient Name: Karen Eden  : 1943  PCP: Yazmin Bangura MD on file    Informant  Patient and medical record      ASSESSMENT and RECOMMENDATIONS    1.  Right ventricular mass  I suspect this could be myxoma  Transesophageal echocardiogram tomorrow.  I discussed benefit, risk and alternative option.  Rare serious life-threatening complications including damage to the, respiratory failure requiring ventilatory support etc. discussed.  Patient understood and agreed.  I explained to the patient that the procedure will be done by one of my associates  Continue anticoagulation  Do not resume ibuprofen at the time of discharge         2.  Left leg edema  Resolved  Venous Doppler of the lower extremity showed no evidence of DVT         3.  Hypertensive heart disease  Blood pressure has improved  Continue indapamide  Continue spironolactone  Continue lisinopril  Continue verapamil    Watch for orthostatic hypotension          4.  Hypokalemia  Corrected        PMH  Past Medical History:   Diagnosis Date   • Allergy    • Arthritis    • Essential (primary) hypertension    • Hemorrhoids    • History of colon polyps    • Non-neoplastic nevus of skin    • Subacromial bursitis 2019    Left        PSH  Past Surgical History:   Procedure Laterality Date   • Tubal ligation         FH  Family History   Problem Relation Age of Onset   • Hypertension Mother    • Diabetes Mother    • Diabetes Father    • Cancer Sister         Breast cancer   • Multiple Sclerosis Sister        SH  Social History     Tobacco Use   • Smoking status: Former Smoker     Packs/day: 0.00   • Smokeless tobacco: Never Used   Substance Use Topics   • Alcohol use: No     Frequency: Never       ALLERGIES:   Allergen Reactions   • Codeine RASH       MEDS  Current Facility-Administered Medications   Medication Dose Route Frequency Provider Last Rate Last Admin   • [START ON 2020] spironolactone (ALDACTONE) tablet 25  Chief Complaint   Patient presents with    Follow-up     2 week follow up       HPI:  Kvng Pierson is a 46 y.o. (: 1970) here today   for 2 week follow up. HPI  Prior to last labs, had stopped meds for approx 1 mo. Tried stopping metformin to see if GI sxs improved. Recurrent issues w/ GI issues. Since resuming trulicity, sig GI issues and belching and poor taste. Has not been on sglt2 inh in the past.  Milagros glipizide. Has not tried Nuris or jardiance in the past.  Sig dec appetite and GI issues w/ trulicity and metformin. Patient's medications, allergies, past medical, surgical, social and family histories were reviewed and updated as appropriate. ROS:  Review of Systems   Constitutional:  Negative for fever. Respiratory:  Negative for shortness of breath. Gastrointestinal:  Positive for nausea. Negative for constipation and diarrhea.            Hemoglobin A1C (%)   Date Value   2023 11.0     LDL Calculated (mg/dL)   Date Value   2023 93     LDL Direct (mg/dL)   Date Value   2023 140 (H)       Past Medical History:   Diagnosis Date    Coronary artery disease involving native heart without angina pectoris     Gastro-esophageal reflux disease without esophagitis 10/24/2017    Mixed hyperglyceridemia     Nonrheumatic tricuspid valve regurgitation     Sleep apnea     TIA (transient ischemic attack)     Tobacco use 10/24/2017       Family History   Problem Relation Age of Onset    Diabetes Mother     High Blood Pressure Mother     Heart Disease Mother     Heart Disease Father     COPD Father     High Blood Pressure Father     Diabetes Sister        Social History     Socioeconomic History    Marital status:      Spouse name: Not on file    Number of children: Not on file    Years of education: Not on file    Highest education level: Not on file   Occupational History    Not on file   Tobacco Use    Smoking status: Never    Smokeless tobacco: Current     Types: Snuff mg  25 mg Oral Daily Percy Rodgers MD       • warfarin (COUMADIN) tablet 5 mg  5 mg Oral Once Percy Rodgers MD       • sodium chloride 0.9% infusion   Intravenous Continuous Percy Rodgers MD       • lidocaine (LIDOCARE) 4 % patch 1 patch  1 patch Transdermal Daily Jaylene Farooq MD   1 patch at 12/27/20 0917   • indapamide (LOZOL) tablet 2.5 mg  2.5 mg Oral Daily Percy Rodgers MD   2.5 mg at 12/27/20 0916   • baclofen (LIORESAL) tablet 20 mg  20 mg Oral BID Evelio Benitez MD   20 mg at 12/27/20 0916   • folic acid (FOLATE) tablet 1 mg  1 mg Oral Daily Evelio Benitez MD   1 mg at 12/27/20 0916   • [START ON 12/28/2020] methotrexate (RHEUMATREX) tablet 5 mg  5 mg Oral Once per day on Mon Evelio Benitez MD       • pantoprazole (PROTONIX) EC tablet 40 mg  40 mg Oral QAM AC Evelio Benitez MD   40 mg at 12/27/20 0535   • verapamil (CALAN SR) tablet 240 mg  240 mg Oral Daily Evelio Benitez MD   240 mg at 12/27/20 0916   • sodium chloride 0.9 % flush bag 25 mL  25 mL Intravenous PRN Evelio Benitez MD       • sodium chloride (PF) 0.9 % injection 2 mL  2 mL Intracatheter 2 times per day Evelio Benitez MD   2 mL at 12/27/20 0916   • hydrALAZINE (APRESOLINE) tablet 10 mg  10 mg Oral Q6H PRN Nitza Montanez MD   10 mg at 12/25/20 0515   • sodium chloride 0.9 % flush bag 25 mL  25 mL Intravenous PRN Nitza Montanez MD       • Potassium Standard Replacement Protocol   Does not apply See Admin Instructions Nitza Montanez MD       • Magnesium Standard Replacement Protocol   Does not apply See Admin Instructions Nitza Montanez MD       • docusate sodium-sennosides (SENOKOT S) 50-8.6 MG 2 tablet  2 tablet Oral Daily PRN Nitza Montanez MD       • lisinopril (ZESTRIL) tablet 40 mg  40 mg Oral Daily Percy Rodgers MD   40 mg at 12/27/20 0916   • WARFARIN - PHARMACIST MONITORED   Does not apply See Admin Instructions Percy Rodgers MD       • heparin (porcine) 25,000 units/250 mL in dextrose 5 % infusion  0-40  Units/kg/hr (Dosing Weight) Intravenous Continuous Sawyer Howard MD 4.5 mL/hr at 12/27/20 0400 6 Units/kg/hr at 12/27/20 0400     Prior to Admission medications    Medication Sig Start Date End Date Taking? Authorizing Provider   potassium chloride (KLOR-CON) 8 MEQ ER tablet Take 8 mEq by mouth 2 times daily.   Yes Outside Provider   aspirin (ECOTRIN) 81 MG EC tablet Take 81 mg by mouth daily.   Yes Outside Provider   Multiple Vitamins-Minerals (MULTIVITAMIN ADULTS PO) Take 1 tablet by mouth daily.   Yes Outside Provider   vitamin E 400 UNIT capsule Take 400 Units by mouth daily.   Yes Outside Provider   Ascorbic Acid (vitamin C) 1000 MG tablet Take 1,000 mg by mouth daily.   Yes Outside Provider   Cholecalciferol (VITAMIN D3 PO) Take 1 tablet by mouth.   Yes Outside Provider   Calcium Carbonate Antacid (CALCIUM CARBONATE PO) Take 600 mg by mouth daily.   Yes Outside Provider   Omega-3 Fatty Acids (OMEGA 3 PO) Take 1 tablet by mouth daily.   Yes Outside Provider   Cyanocobalamin (VITAMIN B-12 PO) Take 1 tablet by mouth daily.   Yes Outside Provider   methotrexate (RHEUMATREX) 2.5 MG tablet Take 2 tablets by mouth 1 day a week. TAKE 2 TABLETS BY MOUTH ONCE A WEEK 12/14/20  Yes Dimple De Souza PA-C   furosemide (LASIX) 20 MG tablet TAKE 1 TABLET EVERY DAY 11/14/20  Yes Yazmin Bangura MD   verapamil (CALAN SR) 240 MG CR tablet TAKE 1 TABLET EVERY DAY 11/14/20  Yes Yazmin Bangura MD   folic acid (FOLATE) 1 MG tablet Take 1 tablet by mouth daily. 9/14/20 9/14/21 Yes You Perez MD   baclofen (LIORESAL) 10 MG tablet TAKE 2 TABLETS TWICE DAILY 4/6/20  Yes Yazmin Bangura MD    MG tablet TAKE 1 TABLET TWICE DAILY 12/14/20   Yazmin Bangura MD       ROS  Review of Systems   Constitutional: Negative for fever.   Respiratory: Negative for shortness of breath.    Cardiovascular: Negative for chest pain, palpitations and leg swelling.   Gastrointestinal: Negative for vomiting.        No difficulty in  swallowing   All other systems reviewed and are negative.       PE  Vitals with min/max:  Vital Last Value 24 Hour Range   Temperature 97.9 °F (36.6 °C) (12/27/20 1036) Temp  Min: 97.9 °F (36.6 °C)  Max: 98.8 °F (37.1 °C)   Pulse 74 (12/27/20 1403) Pulse  Min: 52  Max: 74   Respiratory 12 (12/27/20 1036) Resp  Min: 12  Max: 16   Non-Invasive  Blood Pressure 114/77 (12/27/20 1403) BP  Min: 114/77  Max: 157/85   Pulse Oximetry 96 % (12/27/20 1036) SpO2  Min: 94 %  Max: 97 %           Physical Exam  Vitals signs and nursing note reviewed.   Constitutional:       General: She is not in acute distress.  HENT:      Head: Normocephalic.   Eyes:      Conjunctiva/sclera: Conjunctivae normal.   Neck:      Musculoskeletal: Neck supple.      Vascular: No JVD.   Cardiovascular:      Rate and Rhythm: Normal rate and regular rhythm.      Chest Wall: PMI is not displaced.      Heart sounds: Normal heart sounds, S1 normal and S2 normal. No murmur. No friction rub. No gallop. No S3 or S4 sounds.    Pulmonary:      Effort: No respiratory distress.      Breath sounds: Normal breath sounds. No wheezing.   Abdominal:      General: Bowel sounds are normal. There is no distension.      Palpations: Abdomen is soft.   Musculoskeletal:         General: No deformity.      Right lower leg: No edema.      Left lower leg: No edema.   Neurological:      Mental Status: She is alert and oriented to person, place, and time.          LABS  CBC  Recent Labs   Lab 12/27/20  0652 12/26/20  0659 12/26/20  0607  12/24/20  0330   WBC 7.6 7.3 6.9   < > 7.4  7.4   RBC 3.68* 3.92* 3.74*   < > 3.62*  3.62*   HGB 12.0 13.0 12.3   < > 11.9*  11.9*   HCT 36.3 39.3 37.4   < > 35.2*  35.2*   MCV 98.6 100.3* 100.0   < > 97.2  97.2   MCH 32.6 33.2 32.9   < > 32.9  32.9   MCHC 33.1 33.1 32.9   < > 33.8  33.8   RDW-CV 14.6 14.6 14.6   < > 14.4  14.4    271 248   < > 252  252   Lymphocytes, Percent 21 31  --   --  29    < > = values in this interval not  displayed.     CMP  Recent Labs   Lab 12/27/20  0652 12/26/20  0659 12/25/20  0317 12/24/20 2015 08/11/20  1121 05/15/20  0842 01/03/20  1200   Sodium 141 142 142 140   < > 144 147* 144   Chloride 106 107 109* 107   < > 112* 113* 112*   BUN 20 15 20 19   < > 15 15 13   GFR Estimate,   --   --   --   --   --  55 59 55   BUN/Creatinine Ratio  --   --   --   --   --  14 14 12   BUN/ Creatinine Ratio 17 13 18 15   < >  --   --   --    Potassium 3.7 4.5 3.5 3.8   < > 4.0 3.6 3.9   Glucose 76 86 84 104*   < > 73 82 81   Creatinine 1.16* 1.13* 1.11* 1.23*   < > 1.11* 1.05* 1.11*   GFR Estimate, Non   --   --   --   --   --  48 51 48   CALCIUM  --   --   --   --   --  9.6 9.4 8.7   Calcium 8.8 9.0 8.3* 8.5   < >  --   --   --     < > = values in this interval not displayed.     Recent Labs     12/24/20  1451   RAPDTR 0.04     Recent Labs   Lab 12/24/20  0330   ALKPT 70   BILIRUBIN 0.4   ALBUMIN 3.1*   GPT 15   AST 13      No results found     IMAGING STUDIES:     LAST EKG:    Encounter Date: 12/23/20   Electrocardiogram 12-Lead   Result Value    Ventricular Rate EKG/Min (BPM) 55    Atrial Rate (BPM) 55    MT-Interval (MSEC) 220    QRS-Interval (MSEC) 88    QT-Interval (MSEC) 460    QTc 440    P Axis (Degrees) 69    R Axis (Degrees) -36    T Axis (Degrees) 11    REPORT TEXT      Sinus bradycardia  with 1st degree AV block  Left axis deviation  Nonspecific ST abnormality  Abnormal ECG  When compared with ECG of  23-DEC-2020 17:27,  premature atrial complexes  are no longer  present  MT interval  has increased  Confirmed by VALERY DAVIS, STEVO ([id number]) on 12/25/2020 5:17:00 PM       Monitor sinus rhythm.  64 bpm    LAST ECHO/ECHO STRESS:  Results for orders placed in visit on 12/23/20   TRANSTHORACIC ECHO(TTE) COMPLETE W/ W/O IMAGING AGENT    Impression *[facility] [place]*  [address] [place] [address]  [phone]  Transthoracic Echocardiogram  (TTE)    Patient: Karen Eden  Study Date/Time:         Dec 23 2020 12:36PM  MRN:     [id number]      FIN#:                    [id number]  :     1943   Ht/Wt:                   165.1cm 73kg  Age:     77           BSA/BMI:                 1.8m^2 26.8kg/m^2  Gender:  F            Baseline BP:             175 / 91     Referring Physician:  You Perez     Diagnostic Physician: Alice Rose MD  Sonographer:          STEFANY Myers     --------------------------------------------------------------------------  INDICATIONS:   Murmur.    --------------------------------------------------------------------------  STUDY CONCLUSIONS  SUMMARY:   Left ventricle: The cavity size is normal. Wall thickness is  mildly increased. There is concentric hypertrophy. The ejection fraction  was measured by biplane method of disks. Doppler parameters are consistent  with abnormal left ventricular relaxation (grade 1 diastolic dysfunction).  The ejection fraction is 63%.  Impressions:  There is an intracardiac thrombus in RV, RA. IVC thrombus  cannot be excluded    --------------------------------------------------------------------------  STUDY DATA:  Shaina  Procedure:  Transthoracic echocardiography was  performed. Image quality was good.  M-mode, complete 2D, complete spectral  Doppler, and color Doppler.  Study status:  Routine.  Study completion:  There were no complications.    FINDINGS    LEFT VENTRICLE:  The cavity size is normal. Wall thickness is mildly  increased. There is concentric hypertrophy. Systolic function is normal.  Wall motion is normal; there are no regional wall motion abnormalities.  The ejection fraction was measured by biplane method of disks. The  ejection fraction is 63%. The tissue Doppler parameters are abnormal.  Doppler parameters are consistent with abnormal left ventricular  relaxation (grade 1 diastolic dysfunction).    AORTIC VALVE:   Structurally normal valve. The valve is trileaflet.    Cusp  separation is normal.  Doppler:  Transvalvular velocity is within the  normal range. There is no stenosis.  Trivial regurgitation.    The LVOT to  aortic valve VTI ratio is 0.58. The valve area by the velocity-time  integral method is 2.0cm^2. The valve area index by the velocity-time  integral method is 1.12cm^2/m^2.    The mean systolic gradient is 6mm Hg.    AORTA:  Aortic root: The aortic root is normal in size.  Ascending aorta: The ascending aorta is normal in size.  Descending aorta: The descending aorta is normal in size.    MITRAL VALVE:   Structurally normal valve.   Leaflet separation is normal.   Doppler:  Transvalvular velocity is within the normal range. There is no  evidence for stenosis.  No regurgitation.    The valve area (LVOT  continuity) is 3.7cm^2. The valve area index (LVOT continuity) is  2.06cm^2/m^2.    The mean diastolic gradient is 1mm Hg.    LEFT ATRIUM:  The atrium is normal in size.    RIGHT VENTRICLE:  There is a thrombus.    PULMONIC VALVE:   The leaflets are normal thickness.  Doppler:   No  significant regurgitation. The mean systolic gradient is 2mm Hg. The peak  systolic gradient is 3mm Hg.    TRICUSPID VALVE:   Structurally normal valve.   Leaflet separation is  normal.  Doppler:  Transvalvular velocity is within the normal range.  There is no evidence for stenosis.  Mild regurgitation.    RIGHT ATRIUM:  There is a thrombus in the atrial cavity adjacent to RA  free wall image [id number]).    PERICARDIUM:  The pericardium is normal in appearance.    SYSTEMIC VEINS:  Inferior vena cava: The vessel is normal in size. The respirophasic  diameter changes are in the normal range (greater than or equal to 50%).    BASELINE ECG:   Normal sinus rhythm.    --------------------------------------------------------------------------  Measurements     Left ventricle                Value        Ref        Left atrium continued        Value          Ref   ISAIAS, LAX chord                4.5    cm     3.8 - 5.2  Vol, ES, 1-p A4C             39    ml       22 - 52   ESD, LAX chord                2.9   cm     2.2 - 3.5  Vol/bsa, ES, 1-p A4C         22    ml/m^2   11 - 40   ISAIAS/bsa, LAX chord            2.5   cm/m^2 2.3 - 3.1  Vol, ES, 1-p A2C             27    ml       22 - 52   ESD/bsa, LAX chord            1.6   cm/m^2 1.3 - 2.1  Vol/bsa, ES, 1-p A2C         15    ml/m^2   13 - 40   PW, ED, LAX           (H)     1.1   cm     0.6 - 0.9  Vol, ES, 2-p                 33    ml       ---------   ISAIAS major ax, A4C             6.9   cm     ---------  Vol/bsa, ES, 2-p             18    ml/m^2   16 - 34   ESD major ax, A4C             5.8   cm     ---------  AP dim, ES MM                3.6   cm       2.7 - 3.8   FS major axis, A4C            16    %      ---------  AP dim index, ES MM          2.0   cm/m^2   1.5 - 2.3   ISAIAS/bsa major ax, A4C         3.8   cm/m^2 ---------   ESD/bsa major ax, A4C         3.2   cm/m^2 ---------  Aortic valve                 Value          Ref   HIRAM, A4C                      21.1  cm^2   ---------  Leaflet sep, MM              1.8   cm       ---------   BERKLEY, A4C                      11.6  cm^2   ---------  Mean v, S                    1.18  m/sec    ---------   FAC, A4C                      45    %      ---------  Mean grad, S                 6     mm Hg    ---------   PW, ED                (H)     1.1   cm     0.6 - 0.9  LVOT/AV, VTI ratio           0.58           ---------   IVS/PW, ED                    0.94         ---------  KRISTIN, VTI                     2.0   cm^2     ---------   EDV                           93    ml     46 - 106   KRISTIN/bsa, VTI                 1.12  cm^2/m^2 ---------   ESV                           25    ml     14 - 42   EF                            63    %      54 - 74    Mitral valve                 Value          Ref   SV                            61    ml     ---------  Peak E                       0.55  m/sec    ---------   EDV/bsa                        52    ml/m^2 29 - 61    Peak A                       0.8   m/sec    ---------   ESV/bsa                       14    ml/m^2 8 - 24     Mean v, D                    0.42  m/sec    ---------   SV/bsa                        34    ml/m^2 ---------  VTI leaflet coapt            20.0  cm       ---------   SV, 1-p A4C                   36    ml     ---------  Decel time                   243   ms       ---------   SV/bsa, 1-p A4C               20    ml/m^2 ---------  Mean grad, D                 1     mm Hg    ---------   EDV, 2-p                      53    ml     46 - 106   A duration                   141   ms       ---------   ESV, 2-p                      19    ml     14 - 42    Peak E/A ratio               0.7            ---------   SV, 2-p                       33    ml     ---------  A-VTI                        20.0  cm       ---------   EDV/bsa, 2-p                  29    ml/m^2 29 - 61    MVA, LVOT cont               3.7   cm^2     ---------   ESV/bsa, 2-p                  11    ml/m^2 8 - 24     MVA/bsa, LVOT cont           2.06  cm^2/m^2 ---------   SV/bsa, 2-p                   18.6  ml/m^2 ---------   E', lat gonzalo, TDI              11.3  cm/sec >=10       Pulmonic valve               Value          Ref   E/e', lat gonzalo, TDI            5            ---------  Peak v, S                    0.9   m/sec    ---------   E', med gonzalo, TDI              8.1   cm/sec >=7        Mean adam, S                  0.64  m/sec    ---------   E/e', med gonzalo, TDI            7            ---------  Accel time                   67    ms       ---------   E', avg, TDI                  9.7   cm/sec ---------  Mean grad, S                 2     mm Hg    ---------   E/e', avg, TDI                6            <=14       Peak grad, S                 3     mm Hg    ---------      LVOT                          Value        Ref        Tricuspid valve              Value          Ref   Diam, S                       2.1   cm     ---------  TR peak v                     2.4   m/sec    <=2.8   Area                          3.5   cm^2   ---------  Peak RV-RA grad, S           23    mm Hg    ---------                                                         Max TR adam                   2.4   m/sec    ---------   Ventricular septum            Value        Ref   IVS, ED               (H)     1.0   cm     0.6 - 0.9  Aortic root                  Value          Ref                                                         Root diam, ED                2.9   cm       <4.0   Right ventricle               Value        Ref   ISAIAS, LAX                      2.7   cm     ---------  Ascending aorta              Value          Ref   TAPSE, MM                     2.2   cm     1.7 - 3.1  AAo AP diam, ED              2.9   cm       1.9 - 3.5   Pressure, S                   28    mm Hg  ---------  AAo AP diam/bsa, ED          1.6   cm/m^2   1.0 - 2.2   S' lateral            (H)     13.9  cm/sec 6 - 13.4                                                         Pulmonary artery             Value          Ref   Left atrium                   Value        Ref        Pressure, S                  25    mm Hg    ---------   AP dim, ES                    3.8   cm     2.7 - 3.8   AP dim index                  2.1   cm/m^2 1.5 - 2.3  Systemic veins               Value          Ref   SI dim, A4C                   3.6   cm     ---------  Estimated CVP                5     mm Hg    ---------   Area ES, A4C                  16    cm^2   <=20   Area ES, A2C                  13    cm^2   ---------  Pulmonary veins              Value          Ref   Vol, S                        33    ml     22 - 52    A rev duration               116   ms       ---------   Vol/bsa, S                    18    ml/m^2 16 - 34    PVa-MVa diff                 -25   ms       ---------  Legend:  (L)  and  (H)  isidra values outside specified reference range.    Prepared and electronically signed by  Alice Rose MD  12/23/2020 14:14        CATH REPORT:  No results found for this or any previous visit.    STRESS TEST:   No results found for this or any previous visit.    US VASC LOWER EXTREMITY VENOUS DUPLEX BILATERAL   Final Result      1.   No ultrasound evidence of deep venous thrombosis.         Electronically Signed by: DAVID GIPSON MD    Signed on: 12/24/2020 2:07 PM          NM LUNG PERFUSION IMAGING   Final Result   LOW probability of pulmonary embolism.      Electronically Signed by: JUAN R KAUR MD    Signed on: 12/24/2020 9:24 AM          CTA CHEST PULMONARY EMBOLISM W CONTRAST   Final Result   1.    No acute pulmonary embolism.        2.    Mild cardiomegaly with right heart enlargement.  No intracardiac thrombus definitively visualized.                  Electronically Signed by: RAYNA LEGER M.D.    Signed on: 12/23/2020 9:43 PM          XR CHEST PA AND LATERAL 2 VIEWS   Final Result   No evidence of acute cardiopulmonary pathology.      Electronically Signed by: CASEY ESTRADA DO    Signed on: 12/23/2020 6:00 PM             1.  Right ventricular mass  I suspect this could be myxoma  Transesophageal echocardiogram on Monday  Continue anticoagulation  Do not resume ibuprofen at the time of discharge         2.  Left leg edema  Improved  Venous Doppler of the lower extremity showed no evidence of DVT         3.  Hypertensive heart disease  Blood pressure not well controlled  Left ventricle hypertrophy is noted on echocardiogram.  Systolic function normal.  Diastolic dysfunction is present  Increase dose of indapamide to 2.5 mg daily  Increase Spironolactone to 25 mg twice daily if serum potassium level remains low or low normal.  Watch serum potassium level  Continue lisinopril  Continue verapamil    Watch for orthostatic hypotension          4.  Hypokalemia  Corrected        Percy Rodgers MD, F.A.C.C.  12/27/2020 2:36 PM

## 2024-01-25 ENCOUNTER — OFFICE VISIT (OUTPATIENT)
Dept: FAMILY MEDICINE CLINIC | Age: 54
End: 2024-01-25
Payer: COMMERCIAL

## 2024-01-25 VITALS
WEIGHT: 289 LBS | DIASTOLIC BLOOD PRESSURE: 100 MMHG | OXYGEN SATURATION: 96 % | SYSTOLIC BLOOD PRESSURE: 166 MMHG | HEART RATE: 76 BPM | BODY MASS INDEX: 43.94 KG/M2

## 2024-01-25 DIAGNOSIS — F41.9 ANXIETY AND DEPRESSION: ICD-10-CM

## 2024-01-25 DIAGNOSIS — I25.10 CORONARY ARTERY DISEASE INVOLVING NATIVE CORONARY ARTERY OF NATIVE HEART WITHOUT ANGINA PECTORIS: ICD-10-CM

## 2024-01-25 DIAGNOSIS — G45.9 TIA (TRANSIENT ISCHEMIC ATTACK): ICD-10-CM

## 2024-01-25 DIAGNOSIS — E11.65 TYPE 2 DIABETES MELLITUS WITH HYPERGLYCEMIA, WITHOUT LONG-TERM CURRENT USE OF INSULIN (HCC): Primary | ICD-10-CM

## 2024-01-25 DIAGNOSIS — F32.A ANXIETY AND DEPRESSION: ICD-10-CM

## 2024-01-25 DIAGNOSIS — I10 ESSENTIAL (PRIMARY) HYPERTENSION: ICD-10-CM

## 2024-01-25 DIAGNOSIS — E78.2 MIXED HYPERLIPIDEMIA: ICD-10-CM

## 2024-01-25 DIAGNOSIS — B37.9 YEAST INFECTION: ICD-10-CM

## 2024-01-25 PROCEDURE — 99215 OFFICE O/P EST HI 40 MIN: CPT

## 2024-01-25 PROCEDURE — G8417 CALC BMI ABV UP PARAM F/U: HCPCS

## 2024-01-25 PROCEDURE — G8484 FLU IMMUNIZE NO ADMIN: HCPCS

## 2024-01-25 PROCEDURE — 3080F DIAST BP >= 90 MM HG: CPT

## 2024-01-25 PROCEDURE — G8427 DOCREV CUR MEDS BY ELIG CLIN: HCPCS

## 2024-01-25 PROCEDURE — 3046F HEMOGLOBIN A1C LEVEL >9.0%: CPT

## 2024-01-25 PROCEDURE — 3077F SYST BP >= 140 MM HG: CPT

## 2024-01-25 PROCEDURE — 3017F COLORECTAL CA SCREEN DOC REV: CPT

## 2024-01-25 PROCEDURE — 2022F DILAT RTA XM EVC RTNOPTHY: CPT

## 2024-01-25 PROCEDURE — 4004F PT TOBACCO SCREEN RCVD TLK: CPT

## 2024-01-25 RX ORDER — ESCITALOPRAM OXALATE 10 MG/1
10 TABLET ORAL DAILY
Qty: 90 TABLET | Refills: 3 | Status: SHIPPED | OUTPATIENT
Start: 2024-01-25

## 2024-01-25 RX ORDER — FLUCONAZOLE 150 MG/1
150 TABLET ORAL
Qty: 2 TABLET | Refills: 0 | Status: SHIPPED | OUTPATIENT
Start: 2024-01-25 | End: 2024-01-31

## 2024-01-25 RX ORDER — INSULIN GLARGINE 100 [IU]/ML
10 INJECTION, SOLUTION SUBCUTANEOUS NIGHTLY
Qty: 10 ML | Refills: 3 | Status: SHIPPED | OUTPATIENT
Start: 2024-01-25 | End: 2024-01-25

## 2024-01-25 RX ORDER — BLOOD SUGAR DIAGNOSTIC
1 STRIP MISCELLANEOUS NIGHTLY
Qty: 100 EACH | Refills: 5 | Status: SHIPPED | OUTPATIENT
Start: 2024-01-25

## 2024-01-25 RX ORDER — INSULIN GLARGINE 100 [IU]/ML
INJECTION, SOLUTION SUBCUTANEOUS
Qty: 10 ML | Refills: 3 | Status: SHIPPED | OUTPATIENT
Start: 2024-01-25

## 2024-01-25 ASSESSMENT — ENCOUNTER SYMPTOMS
GASTROINTESTINAL NEGATIVE: 1
RESPIRATORY NEGATIVE: 1

## 2024-01-25 NOTE — PROGRESS NOTES
Chief Complaint   Patient presents with    Foot Pain       HPI:  Manjinder Choi is a 54 y.o. (: 1970) here today   for routine office visit.    Patient last saw Dr. Sequeira on 2023 and was given Tradjenta for his diabetes.  Metformin discontinued prior due to GI side effects.  Was exhibiting abdominal discomfort and GI belching that had a foul taste.  Still currently prescribed Trulicity 4.5 mg weekly, glipizide 10 mg twice daily but is not taking any meds.  Sugars been running anywhere from 2 .  GI symptoms have improved since stopping metformin and Trulicity.    Hypertension: Prescribed lisinopril 20 mg daily, hydrochlorothiazide 12.5 mg daily but is not taking meds.  Blood pressure extremely elevated today.    Hemoglobin A1C   Date Value Ref Range Status   2023 11.0 See comment % Final     Comment:     Comment:  Diagnosis of Diabetes: > or = 6.5%  Increased risk of diabetes (Prediabetes): 5.7-6.4%  Glycemic Control: Nonpregnant Adults: <7.0%                    Pregnant: <6.0%         Lab Results   Component Value Date     2023    K 4.4 2023    CL 97 (L) 2023    CO2 27 2023    BUN 20 2023    CREATININE 0.7 (L) 2023    GLUCOSE 348 (H) 2023    CALCIUM 9.5 2023    PROT 7.2 2023    LABALBU 4.2 2023    BILITOT <0.2 2023    ALKPHOS 109 2023    AST 13 (L) 2023    ALT 18 2023    LABGLOM >60 2023    GFRAA >60 2022    AGRATIO 1.4 2023    GLOB 3.6 (H) 2023       Lab Results   Component Value Date    CHOL 168 2023    TRIG 216 (H) 2023    HDL 32 (L) 2023    LDLCALC 93 2023    VLDL 29.6 2023     Lab Results   Component Value Date    WBC 8.8 2023    HGB 14.9 2023    HCT 45.0 2023    MCV 85.7 2023     2023         Patient's medications, allergies, past medical, surgical, social and family histories were reviewed and updated as

## 2024-02-08 ENCOUNTER — OFFICE VISIT (OUTPATIENT)
Dept: FAMILY MEDICINE CLINIC | Age: 54
End: 2024-02-08
Payer: COMMERCIAL

## 2024-02-08 VITALS
HEART RATE: 71 BPM | WEIGHT: 281.8 LBS | OXYGEN SATURATION: 93 % | SYSTOLIC BLOOD PRESSURE: 138 MMHG | DIASTOLIC BLOOD PRESSURE: 82 MMHG | BODY MASS INDEX: 42.85 KG/M2

## 2024-02-08 DIAGNOSIS — I10 ESSENTIAL (PRIMARY) HYPERTENSION: Primary | ICD-10-CM

## 2024-02-08 DIAGNOSIS — G56.02 ACUTE CARPAL TUNNEL SYNDROME OF LEFT WRIST: ICD-10-CM

## 2024-02-08 DIAGNOSIS — E11.65 TYPE 2 DIABETES MELLITUS WITH HYPERGLYCEMIA, WITHOUT LONG-TERM CURRENT USE OF INSULIN (HCC): ICD-10-CM

## 2024-02-08 PROCEDURE — G8484 FLU IMMUNIZE NO ADMIN: HCPCS

## 2024-02-08 PROCEDURE — 99214 OFFICE O/P EST MOD 30 MIN: CPT

## 2024-02-08 PROCEDURE — 3079F DIAST BP 80-89 MM HG: CPT

## 2024-02-08 PROCEDURE — 2022F DILAT RTA XM EVC RTNOPTHY: CPT

## 2024-02-08 PROCEDURE — 3046F HEMOGLOBIN A1C LEVEL >9.0%: CPT

## 2024-02-08 PROCEDURE — G8427 DOCREV CUR MEDS BY ELIG CLIN: HCPCS

## 2024-02-08 PROCEDURE — G2211 COMPLEX E/M VISIT ADD ON: HCPCS

## 2024-02-08 PROCEDURE — 3017F COLORECTAL CA SCREEN DOC REV: CPT

## 2024-02-08 PROCEDURE — G8417 CALC BMI ABV UP PARAM F/U: HCPCS

## 2024-02-08 PROCEDURE — 4004F PT TOBACCO SCREEN RCVD TLK: CPT

## 2024-02-08 PROCEDURE — 3075F SYST BP GE 130 - 139MM HG: CPT

## 2024-02-08 ASSESSMENT — PATIENT HEALTH QUESTIONNAIRE - PHQ9
SUM OF ALL RESPONSES TO PHQ9 QUESTIONS 1 & 2: 0
4. FEELING TIRED OR HAVING LITTLE ENERGY: 3
5. POOR APPETITE OR OVEREATING: 0
SUM OF ALL RESPONSES TO PHQ QUESTIONS 1-9: 3
10. IF YOU CHECKED OFF ANY PROBLEMS, HOW DIFFICULT HAVE THESE PROBLEMS MADE IT FOR YOU TO DO YOUR WORK, TAKE CARE OF THINGS AT HOME, OR GET ALONG WITH OTHER PEOPLE: 0
2. FEELING DOWN, DEPRESSED OR HOPELESS: 0
6. FEELING BAD ABOUT YOURSELF - OR THAT YOU ARE A FAILURE OR HAVE LET YOURSELF OR YOUR FAMILY DOWN: 0
9. THOUGHTS THAT YOU WOULD BE BETTER OFF DEAD, OR OF HURTING YOURSELF: 0
8. MOVING OR SPEAKING SO SLOWLY THAT OTHER PEOPLE COULD HAVE NOTICED. OR THE OPPOSITE, BEING SO FIGETY OR RESTLESS THAT YOU HAVE BEEN MOVING AROUND A LOT MORE THAN USUAL: 0
1. LITTLE INTEREST OR PLEASURE IN DOING THINGS: 0
SUM OF ALL RESPONSES TO PHQ QUESTIONS 1-9: 3
SUM OF ALL RESPONSES TO PHQ QUESTIONS 1-9: 3
3. TROUBLE FALLING OR STAYING ASLEEP: 0
SUM OF ALL RESPONSES TO PHQ QUESTIONS 1-9: 3
7. TROUBLE CONCENTRATING ON THINGS, SUCH AS READING THE NEWSPAPER OR WATCHING TELEVISION: 0

## 2024-02-08 ASSESSMENT — ENCOUNTER SYMPTOMS
RESPIRATORY NEGATIVE: 1
GASTROINTESTINAL NEGATIVE: 1

## 2024-02-08 NOTE — PROGRESS NOTES
Other Topics Concern    Not on file   Social History Narrative    Not on file     Social Determinants of Health     Financial Resource Strain: Medium Risk (7/6/2023)    Overall Financial Resource Strain (CARDIA)     Difficulty of Paying Living Expenses: Somewhat hard   Food Insecurity: Not on file (7/6/2023)   Transportation Needs: Unknown (7/6/2023)    PRAPARE - Transportation     Lack of Transportation (Medical): Not on file     Lack of Transportation (Non-Medical): No   Physical Activity: Not on file   Stress: Not on file   Social Connections: Not on file   Intimate Partner Violence: Not on file   Housing Stability: Unknown (7/6/2023)    Housing Stability Vital Sign     Unable to Pay for Housing in the Last Year: Not on file     Number of Places Lived in the Last Year: Not on file     Unstable Housing in the Last Year: No       Prior to Visit Medications    Medication Sig Taking? Authorizing Provider   insulin glargine (LANTUS) 100 UNIT/ML injection vial 10 units nightly x1 week. Increase by 3 units weekly until fasting glucose 150 or below. Max nightly dosing of 100 Units. Yes Sal Esqueda APRN - CNP   linagliptin (TRADJENTA) 5 MG tablet Take 1 tablet by mouth daily Yes Sal Esqueda APRN - CNP   escitalopram (LEXAPRO) 10 MG tablet Take 1 tablet by mouth daily Yes Sal Esqueda APRN - CNP   glipiZIDE (GLUCOTROL) 10 MG tablet Take 1 tablet by mouth 2 times daily (before meals) Yes Sal Esqueda APRN - CNP   pantoprazole (PROTONIX) 40 MG tablet Take 1 tablet by mouth in the morning and at bedtime Yes Sal Esqueda APRN - CNP   ondansetron (ZOFRAN-ODT) 4 MG disintegrating tablet Take 1 tablet by mouth 3 times daily as needed for Nausea or Vomiting Yes Sal Esqueda APRN - CNP   atorvastatin (LIPITOR) 20 MG tablet Take 1 tablet by mouth daily Yes Mario Sequeira MD   aspirin 81 MG EC tablet Take 1 tablet by mouth daily Yes Sal Esqueda APRN - LANI   lisinopril

## 2024-03-11 ENCOUNTER — OFFICE VISIT (OUTPATIENT)
Dept: FAMILY MEDICINE CLINIC | Age: 54
End: 2024-03-11
Payer: COMMERCIAL

## 2024-03-11 VITALS
SYSTOLIC BLOOD PRESSURE: 136 MMHG | OXYGEN SATURATION: 95 % | BODY MASS INDEX: 43.79 KG/M2 | DIASTOLIC BLOOD PRESSURE: 84 MMHG | HEART RATE: 70 BPM | WEIGHT: 288 LBS

## 2024-03-11 DIAGNOSIS — E78.2 MIXED HYPERLIPIDEMIA: ICD-10-CM

## 2024-03-11 DIAGNOSIS — I25.10 CORONARY ARTERY DISEASE INVOLVING NATIVE CORONARY ARTERY OF NATIVE HEART WITHOUT ANGINA PECTORIS: ICD-10-CM

## 2024-03-11 DIAGNOSIS — G45.9 TIA (TRANSIENT ISCHEMIC ATTACK): ICD-10-CM

## 2024-03-11 DIAGNOSIS — E11.65 TYPE 2 DIABETES MELLITUS WITH HYPERGLYCEMIA, WITHOUT LONG-TERM CURRENT USE OF INSULIN (HCC): Primary | ICD-10-CM

## 2024-03-11 DIAGNOSIS — F41.9 ANXIETY AND DEPRESSION: ICD-10-CM

## 2024-03-11 DIAGNOSIS — I10 ESSENTIAL (PRIMARY) HYPERTENSION: ICD-10-CM

## 2024-03-11 DIAGNOSIS — F32.A ANXIETY AND DEPRESSION: ICD-10-CM

## 2024-03-11 DIAGNOSIS — K21.9 GASTROESOPHAGEAL REFLUX DISEASE, UNSPECIFIED WHETHER ESOPHAGITIS PRESENT: ICD-10-CM

## 2024-03-11 LAB
ALBUMIN SERPL-MCNC: 4 G/DL (ref 3.4–5)
ALBUMIN/GLOB SERPL: 1.3 {RATIO} (ref 1.1–2.2)
ALP SERPL-CCNC: 97 U/L (ref 40–129)
ALT SERPL-CCNC: 15 U/L (ref 10–40)
ANION GAP SERPL CALCULATED.3IONS-SCNC: 13 MMOL/L (ref 3–16)
AST SERPL-CCNC: 11 U/L (ref 15–37)
BILIRUB SERPL-MCNC: 0.4 MG/DL (ref 0–1)
BUN SERPL-MCNC: 18 MG/DL (ref 7–20)
CALCIUM SERPL-MCNC: 9.5 MG/DL (ref 8.3–10.6)
CHLORIDE SERPL-SCNC: 99 MMOL/L (ref 99–110)
CHOLEST SERPL-MCNC: 153 MG/DL (ref 0–199)
CO2 SERPL-SCNC: 25 MMOL/L (ref 21–32)
CREAT SERPL-MCNC: 0.6 MG/DL (ref 0.9–1.3)
GFR SERPLBLD CREATININE-BSD FMLA CKD-EPI: >60 ML/MIN/{1.73_M2}
GLUCOSE SERPL-MCNC: 282 MG/DL (ref 70–99)
HDLC SERPL-MCNC: 30 MG/DL (ref 40–60)
LDLC SERPL CALC-MCNC: 92 MG/DL
POTASSIUM SERPL-SCNC: 5.2 MMOL/L (ref 3.5–5.1)
PROT SERPL-MCNC: 7.2 G/DL (ref 6.4–8.2)
SODIUM SERPL-SCNC: 137 MMOL/L (ref 136–145)
TRIGL SERPL-MCNC: 156 MG/DL (ref 0–150)
VLDLC SERPL CALC-MCNC: 31 MG/DL

## 2024-03-11 PROCEDURE — G2211 COMPLEX E/M VISIT ADD ON: HCPCS

## 2024-03-11 PROCEDURE — 4004F PT TOBACCO SCREEN RCVD TLK: CPT

## 2024-03-11 PROCEDURE — 36415 COLL VENOUS BLD VENIPUNCTURE: CPT

## 2024-03-11 PROCEDURE — G8427 DOCREV CUR MEDS BY ELIG CLIN: HCPCS

## 2024-03-11 PROCEDURE — 3046F HEMOGLOBIN A1C LEVEL >9.0%: CPT

## 2024-03-11 PROCEDURE — 3075F SYST BP GE 130 - 139MM HG: CPT

## 2024-03-11 PROCEDURE — G8484 FLU IMMUNIZE NO ADMIN: HCPCS

## 2024-03-11 PROCEDURE — 2022F DILAT RTA XM EVC RTNOPTHY: CPT

## 2024-03-11 PROCEDURE — 99215 OFFICE O/P EST HI 40 MIN: CPT

## 2024-03-11 PROCEDURE — 3079F DIAST BP 80-89 MM HG: CPT

## 2024-03-11 PROCEDURE — G8417 CALC BMI ABV UP PARAM F/U: HCPCS

## 2024-03-11 PROCEDURE — 3017F COLORECTAL CA SCREEN DOC REV: CPT

## 2024-03-11 RX ORDER — PANTOPRAZOLE SODIUM 40 MG/1
40 TABLET, DELAYED RELEASE ORAL 2 TIMES DAILY
Qty: 180 TABLET | Refills: 3 | Status: SHIPPED | OUTPATIENT
Start: 2024-03-11

## 2024-03-11 RX ORDER — ESCITALOPRAM OXALATE 10 MG/1
10 TABLET ORAL DAILY
Qty: 90 TABLET | Refills: 3 | Status: SHIPPED | OUTPATIENT
Start: 2024-03-11

## 2024-03-11 RX ORDER — HYDROCHLOROTHIAZIDE 12.5 MG/1
12.5 CAPSULE, GELATIN COATED ORAL EVERY MORNING
Qty: 90 CAPSULE | Refills: 3 | Status: SHIPPED | OUTPATIENT
Start: 2024-03-11

## 2024-03-11 RX ORDER — ASPIRIN 81 MG/1
81 TABLET ORAL DAILY
Qty: 90 TABLET | Refills: 3 | Status: SHIPPED | OUTPATIENT
Start: 2024-03-11

## 2024-03-11 ASSESSMENT — ENCOUNTER SYMPTOMS
RESPIRATORY NEGATIVE: 1
GASTROINTESTINAL NEGATIVE: 1

## 2024-03-11 NOTE — PROGRESS NOTES
Chief Complaint   Patient presents with    Diabetes       HPI:  Manjinder Choi is a 54 y.o. (: 1970) here today   for routine office visit.    Diabetes: Taking Lipitor, ACE inhibitor.  On Lantus, glipizide, Tradjenta. Has been increasing lantus 3 units weekly since starting as advised. Currently on 28units nightly. Feeling much better. More energy. Usually running around 240 fasting but prior was up around 600.     Hyperlipidemia: Taking Lipitor.     Hypertension: Taking lisinopril.     Anxiety well-controlled at this time.    Abdominal discomfort symptoms greatly improved since adjusting diabetic treatment plan.  Acid reflux not near as much of an issue at this time and feels fairly well-controlled      Patient's medications, allergies, past medical, surgical, social and family histories were reviewed and updated as appropriate.    ROS:  Review of Systems   Constitutional: Negative.    HENT: Negative.     Respiratory: Negative.     Cardiovascular: Negative.    Gastrointestinal: Negative.    Musculoskeletal: Negative.    Neurological: Negative.    Psychiatric/Behavioral: Negative.             Hemoglobin A1C (%)   Date Value   2023 11.0     LDL Calculated (mg/dL)   Date Value   2023 93     LDL Direct (mg/dL)   Date Value   2023 140 (H)       Past Medical History:   Diagnosis Date    Coronary artery disease involving native heart without angina pectoris     Gastro-esophageal reflux disease without esophagitis 10/24/2017    Mixed hyperglyceridemia     Nonrheumatic tricuspid valve regurgitation     Sleep apnea     TIA (transient ischemic attack)     Tobacco use 10/24/2017       Family History   Problem Relation Age of Onset    Diabetes Mother     High Blood Pressure Mother     Heart Disease Mother     Heart Disease Father     COPD Father     High Blood Pressure Father     Diabetes Sister        Social History     Socioeconomic History    Marital status:      Spouse name: Not on file

## 2024-03-12 LAB
EST. AVERAGE GLUCOSE BLD GHB EST-MCNC: 329.3 MG/DL
HBA1C MFR BLD: 13.1 %

## 2024-04-15 DIAGNOSIS — E11.65 TYPE 2 DIABETES MELLITUS WITH HYPERGLYCEMIA, WITHOUT LONG-TERM CURRENT USE OF INSULIN (HCC): ICD-10-CM

## 2024-04-15 RX ORDER — INSULIN GLARGINE 100 [IU]/ML
28 INJECTION, SOLUTION SUBCUTANEOUS NIGHTLY
Qty: 5 EACH | Refills: 5 | Status: SHIPPED | OUTPATIENT
Start: 2024-04-15

## 2024-05-08 DIAGNOSIS — E11.65 TYPE 2 DIABETES MELLITUS WITH HYPERGLYCEMIA, WITHOUT LONG-TERM CURRENT USE OF INSULIN (HCC): ICD-10-CM

## 2024-05-16 NOTE — TELEPHONE ENCOUNTER
[facility] called and is asking if this can be Jardiance 25mg daily instead of Jardiance 10mg 2.5 tabs daily?

## 2024-06-12 DIAGNOSIS — E11.65 TYPE 2 DIABETES MELLITUS WITH HYPERGLYCEMIA, WITHOUT LONG-TERM CURRENT USE OF INSULIN (HCC): ICD-10-CM

## 2024-06-12 RX ORDER — INSULIN GLARGINE 100 [IU]/ML
28 INJECTION, SOLUTION SUBCUTANEOUS NIGHTLY
Qty: 5 EACH | Refills: 5 | Status: SHIPPED | OUTPATIENT
Start: 2024-06-12

## 2024-06-17 DIAGNOSIS — E11.65 TYPE 2 DIABETES MELLITUS WITH HYPERGLYCEMIA, WITHOUT LONG-TERM CURRENT USE OF INSULIN (HCC): ICD-10-CM

## 2024-06-20 DIAGNOSIS — E11.65 TYPE 2 DIABETES MELLITUS WITH HYPERGLYCEMIA, WITHOUT LONG-TERM CURRENT USE OF INSULIN (HCC): ICD-10-CM

## 2024-06-21 DIAGNOSIS — E11.65 TYPE 2 DIABETES MELLITUS WITH HYPERGLYCEMIA, WITHOUT LONG-TERM CURRENT USE OF INSULIN (HCC): ICD-10-CM

## 2024-06-24 ENCOUNTER — OFFICE VISIT (OUTPATIENT)
Dept: FAMILY MEDICINE CLINIC | Age: 54
End: 2024-06-24
Payer: COMMERCIAL

## 2024-06-24 VITALS
HEIGHT: 68 IN | DIASTOLIC BLOOD PRESSURE: 84 MMHG | SYSTOLIC BLOOD PRESSURE: 126 MMHG | BODY MASS INDEX: 44.1 KG/M2 | WEIGHT: 291 LBS | RESPIRATION RATE: 16 BRPM | OXYGEN SATURATION: 96 % | HEART RATE: 63 BPM

## 2024-06-24 DIAGNOSIS — F41.9 ANXIETY AND DEPRESSION: ICD-10-CM

## 2024-06-24 DIAGNOSIS — F32.A ANXIETY AND DEPRESSION: ICD-10-CM

## 2024-06-24 DIAGNOSIS — E08.00 DIABETES MELLITUS DUE TO UNDERLYING CONDITION WITH HYPEROSMOLARITY WITHOUT COMA, WITH LONG-TERM CURRENT USE OF INSULIN (HCC): Primary | ICD-10-CM

## 2024-06-24 DIAGNOSIS — I10 ESSENTIAL (PRIMARY) HYPERTENSION: ICD-10-CM

## 2024-06-24 DIAGNOSIS — E78.2 MIXED HYPERLIPIDEMIA: ICD-10-CM

## 2024-06-24 DIAGNOSIS — K21.9 GASTROESOPHAGEAL REFLUX DISEASE, UNSPECIFIED WHETHER ESOPHAGITIS PRESENT: ICD-10-CM

## 2024-06-24 DIAGNOSIS — I25.10 CORONARY ARTERY DISEASE INVOLVING NATIVE CORONARY ARTERY OF NATIVE HEART WITHOUT ANGINA PECTORIS: ICD-10-CM

## 2024-06-24 DIAGNOSIS — G45.9 TIA (TRANSIENT ISCHEMIC ATTACK): ICD-10-CM

## 2024-06-24 DIAGNOSIS — Z79.4 DIABETES MELLITUS DUE TO UNDERLYING CONDITION WITH HYPEROSMOLARITY WITHOUT COMA, WITH LONG-TERM CURRENT USE OF INSULIN (HCC): Primary | ICD-10-CM

## 2024-06-24 PROCEDURE — 3079F DIAST BP 80-89 MM HG: CPT

## 2024-06-24 PROCEDURE — 3074F SYST BP LT 130 MM HG: CPT

## 2024-06-24 PROCEDURE — 4004F PT TOBACCO SCREEN RCVD TLK: CPT

## 2024-06-24 PROCEDURE — 99215 OFFICE O/P EST HI 40 MIN: CPT

## 2024-06-24 PROCEDURE — G8417 CALC BMI ABV UP PARAM F/U: HCPCS

## 2024-06-24 PROCEDURE — G2211 COMPLEX E/M VISIT ADD ON: HCPCS

## 2024-06-24 PROCEDURE — 3017F COLORECTAL CA SCREEN DOC REV: CPT

## 2024-06-24 PROCEDURE — G8427 DOCREV CUR MEDS BY ELIG CLIN: HCPCS

## 2024-06-24 RX ORDER — INSULIN GLARGINE 100 [IU]/ML
INJECTION, SOLUTION SUBCUTANEOUS
COMMUNITY
Start: 2024-06-12

## 2024-06-24 RX ORDER — EMPAGLIFLOZIN 25 MG/1
25 TABLET, FILM COATED ORAL DAILY
COMMUNITY
Start: 2024-05-21

## 2024-06-24 ASSESSMENT — ENCOUNTER SYMPTOMS
GASTROINTESTINAL NEGATIVE: 1
RESPIRATORY NEGATIVE: 1

## 2024-06-24 NOTE — PROGRESS NOTES
Chief Complaint   Patient presents with    3 Month Follow-Up    Diabetes       HPI:  Manjinder Choi is a 54 y.o. (: 1970) here today   for 3 month follow up.     Diabetes: Is on Jardiance 25 mg daily, Tradjenta 5 mg daily, glipizide 10 mg twice daily, Lantus    Hyperlipidemia: Taking Lipitor 20 mg daily.    Hypertension: BP stable at 126/84.  Taking lisinopril 20 mg daily, hydrochlorothiazide 12.5 mg daily      Patient's medications, allergies, past medical, surgical, social and family histories were reviewed and updated as appropriate.    ROS:  Review of Systems   Constitutional: Negative.    HENT: Negative.     Respiratory: Negative.     Cardiovascular: Negative.    Gastrointestinal: Negative.    Neurological: Negative.    Psychiatric/Behavioral: Negative.             Hemoglobin A1C (%)   Date Value   2024 13.1     LDL Direct (mg/dL)   Date Value   2023 140 (H)       Past Medical History:   Diagnosis Date    Coronary artery disease involving native heart without angina pectoris     Gastro-esophageal reflux disease without esophagitis 10/24/2017    Mixed hyperglyceridemia     Nonrheumatic tricuspid valve regurgitation     Sleep apnea     TIA (transient ischemic attack)     Tobacco use 10/24/2017       Family History   Problem Relation Age of Onset    Diabetes Mother     High Blood Pressure Mother     Heart Disease Mother     Heart Disease Father     COPD Father     High Blood Pressure Father     Diabetes Sister        Social History     Socioeconomic History    Marital status:      Spouse name: Not on file    Number of children: Not on file    Years of education: Not on file    Highest education level: Not on file   Occupational History    Not on file   Tobacco Use    Smoking status: Never    Smokeless tobacco: Current     Types: Snuff   Vaping Use    Vaping Use: Never used   Substance and Sexual Activity    Alcohol use: Never    Drug use: Never    Sexual activity: Not on file   Other

## 2024-06-25 ENCOUNTER — TELEPHONE (OUTPATIENT)
Dept: FAMILY MEDICINE CLINIC | Age: 54
End: 2024-06-25

## 2024-06-25 DIAGNOSIS — E08.00 DIABETES MELLITUS DUE TO UNDERLYING CONDITION WITH HYPEROSMOLARITY WITHOUT COMA, WITH LONG-TERM CURRENT USE OF INSULIN (HCC): Primary | ICD-10-CM

## 2024-06-25 DIAGNOSIS — Z79.4 DIABETES MELLITUS DUE TO UNDERLYING CONDITION WITH HYPEROSMOLARITY WITHOUT COMA, WITH LONG-TERM CURRENT USE OF INSULIN (HCC): Primary | ICD-10-CM

## 2024-06-25 LAB
ALBUMIN SERPL-MCNC: 3.7 G/DL (ref 3.4–5)
ALBUMIN/GLOB SERPL: 1.1 {RATIO} (ref 1.1–2.2)
ALP SERPL-CCNC: 99 U/L (ref 40–129)
ALT SERPL-CCNC: 13 U/L (ref 10–40)
ANION GAP SERPL CALCULATED.3IONS-SCNC: 10 MMOL/L (ref 3–16)
AST SERPL-CCNC: 8 U/L (ref 15–37)
BILIRUB SERPL-MCNC: <0.2 MG/DL (ref 0–1)
BUN SERPL-MCNC: 21 MG/DL (ref 7–20)
CALCIUM SERPL-MCNC: 9.3 MG/DL (ref 8.3–10.6)
CHLORIDE SERPL-SCNC: 98 MMOL/L (ref 99–110)
CHOLEST SERPL-MCNC: 151 MG/DL (ref 0–199)
CO2 SERPL-SCNC: 28 MMOL/L (ref 21–32)
CREAT SERPL-MCNC: 0.7 MG/DL (ref 0.9–1.3)
EST. AVERAGE GLUCOSE BLD GHB EST-MCNC: 263.3 MG/DL
GFR SERPLBLD CREATININE-BSD FMLA CKD-EPI: >90 ML/MIN/{1.73_M2}
GLUCOSE SERPL-MCNC: 347 MG/DL (ref 70–99)
HBA1C MFR BLD: 10.8 %
HDLC SERPL-MCNC: 35 MG/DL (ref 40–60)
LDLC SERPL CALC-MCNC: 71 MG/DL
POTASSIUM SERPL-SCNC: 4.6 MMOL/L (ref 3.5–5.1)
PROT SERPL-MCNC: 7.1 G/DL (ref 6.4–8.2)
SODIUM SERPL-SCNC: 136 MMOL/L (ref 136–145)
TRIGL SERPL-MCNC: 225 MG/DL (ref 0–150)
VLDLC SERPL CALC-MCNC: 45 MG/DL

## 2024-06-25 RX ORDER — ACYCLOVIR 400 MG/1
1 TABLET ORAL
Qty: 9 EACH | Refills: 5 | Status: SHIPPED | OUTPATIENT
Start: 2024-06-25

## 2024-06-27 DIAGNOSIS — E08.00 DIABETES MELLITUS DUE TO UNDERLYING CONDITION WITH HYPEROSMOLARITY WITHOUT COMA, WITH LONG-TERM CURRENT USE OF INSULIN (HCC): Primary | ICD-10-CM

## 2024-06-27 DIAGNOSIS — Z79.4 DIABETES MELLITUS DUE TO UNDERLYING CONDITION WITH HYPEROSMOLARITY WITHOUT COMA, WITH LONG-TERM CURRENT USE OF INSULIN (HCC): Primary | ICD-10-CM

## 2024-06-27 RX ORDER — ACYCLOVIR 400 MG/1
1 TABLET ORAL ONCE
Qty: 1 EACH | Refills: 0 | Status: SHIPPED | OUTPATIENT
Start: 2024-06-27 | End: 2024-06-27

## 2024-07-30 ENCOUNTER — OFFICE VISIT (OUTPATIENT)
Dept: FAMILY MEDICINE CLINIC | Age: 54
End: 2024-07-30
Payer: COMMERCIAL

## 2024-07-30 VITALS
SYSTOLIC BLOOD PRESSURE: 136 MMHG | HEART RATE: 69 BPM | HEIGHT: 68 IN | OXYGEN SATURATION: 93 % | DIASTOLIC BLOOD PRESSURE: 84 MMHG | WEIGHT: 299.6 LBS | BODY MASS INDEX: 45.41 KG/M2 | RESPIRATION RATE: 16 BRPM

## 2024-07-30 DIAGNOSIS — N39.0 URINARY TRACT INFECTION WITH HEMATURIA, SITE UNSPECIFIED: ICD-10-CM

## 2024-07-30 DIAGNOSIS — R31.9 URINARY TRACT INFECTION WITH HEMATURIA, SITE UNSPECIFIED: ICD-10-CM

## 2024-07-30 DIAGNOSIS — B34.9 VIRAL ILLNESS: ICD-10-CM

## 2024-07-30 DIAGNOSIS — B37.9 YEAST INFECTION: ICD-10-CM

## 2024-07-30 DIAGNOSIS — Z09 HOSPITAL DISCHARGE FOLLOW-UP: Primary | ICD-10-CM

## 2024-07-30 DIAGNOSIS — G45.9 TIA (TRANSIENT ISCHEMIC ATTACK): ICD-10-CM

## 2024-07-30 PROCEDURE — 3079F DIAST BP 80-89 MM HG: CPT

## 2024-07-30 PROCEDURE — 3017F COLORECTAL CA SCREEN DOC REV: CPT

## 2024-07-30 PROCEDURE — 4004F PT TOBACCO SCREEN RCVD TLK: CPT

## 2024-07-30 PROCEDURE — G2211 COMPLEX E/M VISIT ADD ON: HCPCS

## 2024-07-30 PROCEDURE — 99214 OFFICE O/P EST MOD 30 MIN: CPT

## 2024-07-30 PROCEDURE — G8417 CALC BMI ABV UP PARAM F/U: HCPCS

## 2024-07-30 PROCEDURE — 3075F SYST BP GE 130 - 139MM HG: CPT

## 2024-07-30 PROCEDURE — G8427 DOCREV CUR MEDS BY ELIG CLIN: HCPCS

## 2024-07-30 RX ORDER — ACYCLOVIR 400 MG/1
TABLET ORAL
COMMUNITY
Start: 2024-06-27

## 2024-07-30 RX ORDER — FLUCONAZOLE 150 MG/1
150 TABLET ORAL
Qty: 2 TABLET | Refills: 0 | Status: SHIPPED | OUTPATIENT
Start: 2024-07-30 | End: 2024-08-05

## 2024-07-30 RX ORDER — PEN NEEDLE, DIABETIC 29 G X1/2"
NEEDLE, DISPOSABLE MISCELLANEOUS
COMMUNITY
Start: 2024-06-24

## 2024-07-30 SDOH — ECONOMIC STABILITY: INCOME INSECURITY: HOW HARD IS IT FOR YOU TO PAY FOR THE VERY BASICS LIKE FOOD, HOUSING, MEDICAL CARE, AND HEATING?: NOT HARD AT ALL

## 2024-07-30 SDOH — ECONOMIC STABILITY: FOOD INSECURITY: WITHIN THE PAST 12 MONTHS, YOU WORRIED THAT YOUR FOOD WOULD RUN OUT BEFORE YOU GOT MONEY TO BUY MORE.: NEVER TRUE

## 2024-07-30 SDOH — ECONOMIC STABILITY: FOOD INSECURITY: WITHIN THE PAST 12 MONTHS, THE FOOD YOU BOUGHT JUST DIDN'T LAST AND YOU DIDN'T HAVE MONEY TO GET MORE.: NEVER TRUE

## 2024-07-30 ASSESSMENT — ENCOUNTER SYMPTOMS: RESPIRATORY NEGATIVE: 1

## 2024-07-30 NOTE — PROGRESS NOTES
and may need to reestablish as a new patient therefore external referral to neurology was placed today  - Non [facility] - External Referral To Neurology    3. Viral illness  Patient was treated for viral illness at [facility].  Advised the patient to monitor for improving symptoms.  Continue staying well-hydrated and getting adequate vitamin C, D, zinc for immune support    4. Urinary tract infection with hematuria, site unspecified  Patient was given Rocephin and urine culture was provided during his course of treatment at [facility].  Recommend the patient contact our office by end of day tomorrow or Wednesday at the latest if has not heard back on urine culture result.  If no updated information would like the patient to have repeat urinalysis and urine culture performed by our office.    5. Yeast infection  Patient has probable yeast infection to skin fold of the abdominal area treat with oral Diflucan.  Take as directed and follow-up as needed  - fluconazole (DIFLUCAN) 150 MG tablet; Take 1 tablet by mouth every 72 hours for 6 days  Dispense: 2 tablet; Refill: 0      This document was prepared by a combination of typing and transcription through a voice recognition software.  30 min spent on pt care.   Sal Esqueda, APRN - CNP

## 2024-07-31 ENCOUNTER — HOSPITAL ENCOUNTER (INPATIENT)
Age: 54
LOS: 1 days | Discharge: HOME | End: 2024-08-01
Attending: EMERGENCY MEDICINE | Admitting: INTERNAL MEDICINE
Payer: COMMERCIAL

## 2024-07-31 ENCOUNTER — TELEPHONE (OUTPATIENT)
Dept: FAMILY MEDICINE CLINIC | Age: 54
End: 2024-07-31

## 2024-07-31 ENCOUNTER — APPOINTMENT (OUTPATIENT)
Dept: GENERAL RADIOLOGY | Age: 54
End: 2024-07-31
Payer: COMMERCIAL

## 2024-07-31 DIAGNOSIS — R53.83 FATIGUE, UNSPECIFIED TYPE: ICD-10-CM

## 2024-07-31 DIAGNOSIS — R53.1 RIGHT SIDED WEAKNESS: Primary | ICD-10-CM

## 2024-07-31 LAB
ALBUMIN SERPL-MCNC: 4 G/DL (ref 3.4–5)
ALBUMIN/GLOB SERPL: 1.3 {RATIO} (ref 1.1–2.2)
ALP SERPL-CCNC: 105 U/L (ref 40–129)
ALT SERPL-CCNC: 37 U/L (ref 10–40)
ANION GAP SERPL CALCULATED.3IONS-SCNC: 9 MMOL/L (ref 3–16)
AST SERPL-CCNC: 18 U/L (ref 15–37)
BACTERIA URNS QL MICRO: ABNORMAL /HPF
BASE EXCESS BLDV CALC-SCNC: 0.4 MMOL/L (ref -3–3)
BASOPHILS # BLD: 0.1 K/UL (ref 0–0.2)
BASOPHILS NFR BLD: 0.7 %
BILIRUB SERPL-MCNC: 0.5 MG/DL (ref 0–1)
BILIRUB UR QL STRIP.AUTO: NEGATIVE
BUN SERPL-MCNC: 16 MG/DL (ref 7–20)
CALCIUM SERPL-MCNC: 9.5 MG/DL (ref 8.3–10.6)
CHLORIDE SERPL-SCNC: 99 MMOL/L (ref 99–110)
CLARITY UR: CLEAR
CO2 BLDV-SCNC: 27 MMOL/L
CO2 SERPL-SCNC: 28 MMOL/L (ref 21–32)
COHGB MFR BLDV: 1.5 % (ref 0–1.5)
COLOR UR: YELLOW
CREAT SERPL-MCNC: <0.5 MG/DL (ref 0.9–1.3)
DEPRECATED RDW RBC AUTO: 14.6 % (ref 12.4–15.4)
EOSINOPHIL # BLD: 0.2 K/UL (ref 0–0.6)
EOSINOPHIL NFR BLD: 1.8 %
EPI CELLS #/AREA URNS HPF: ABNORMAL /HPF (ref 0–5)
GFR SERPLBLD CREATININE-BSD FMLA CKD-EPI: >90 ML/MIN/{1.73_M2}
GLUCOSE SERPL-MCNC: 232 MG/DL (ref 70–99)
GLUCOSE UR STRIP.AUTO-MCNC: 250 MG/DL
HCO3 BLDV-SCNC: 25.9 MMOL/L (ref 23–29)
HCT VFR BLD AUTO: 44.3 % (ref 40.5–52.5)
HGB BLD-MCNC: 13.9 G/DL (ref 13.5–17.5)
HGB UR QL STRIP.AUTO: NEGATIVE
KETONES UR STRIP.AUTO-MCNC: NEGATIVE MG/DL
LEUKOCYTE ESTERASE UR QL STRIP.AUTO: ABNORMAL
LYMPHOCYTES # BLD: 2.5 K/UL (ref 1–5.1)
LYMPHOCYTES NFR BLD: 23 %
MCH RBC QN AUTO: 26.4 PG (ref 26–34)
MCHC RBC AUTO-ENTMCNC: 31.4 G/DL (ref 31–36)
MCV RBC AUTO: 84.2 FL (ref 80–100)
METHGB MFR BLDV: 0.3 %
MONOCYTES # BLD: 0.7 K/UL (ref 0–1.3)
MONOCYTES NFR BLD: 6.6 %
NEUTROPHILS # BLD: 7.5 K/UL (ref 1.7–7.7)
NEUTROPHILS NFR BLD: 67.9 %
NITRITE UR QL STRIP.AUTO: NEGATIVE
O2 THERAPY: ABNORMAL
PCO2 BLDV: 44.6 MMHG (ref 40–50)
PH BLDV: 7.38 [PH] (ref 7.35–7.45)
PH UR STRIP.AUTO: 6 [PH] (ref 5–8)
PLATELET # BLD AUTO: 302 K/UL (ref 135–450)
PMV BLD AUTO: 8.5 FL (ref 5–10.5)
PO2 BLDV: 60.4 MMHG (ref 25–40)
POTASSIUM SERPL-SCNC: 4.4 MMOL/L (ref 3.5–5.1)
PROT SERPL-MCNC: 7.2 G/DL (ref 6.4–8.2)
PROT UR STRIP.AUTO-MCNC: NEGATIVE MG/DL
RBC # BLD AUTO: 5.26 M/UL (ref 4.2–5.9)
RBC #/AREA URNS HPF: ABNORMAL /HPF (ref 0–4)
SAO2 % BLDV: 91 %
SARS-COV-2 RDRP RESP QL NAA+PROBE: NOT DETECTED
SODIUM SERPL-SCNC: 136 MMOL/L (ref 136–145)
SP GR UR STRIP.AUTO: 1.02 (ref 1–1.03)
TROPONIN, HIGH SENSITIVITY: 10 NG/L (ref 0–22)
UA COMPLETE W REFLEX CULTURE PNL UR: ABNORMAL
UA DIPSTICK W REFLEX MICRO PNL UR: YES
URN SPEC COLLECT METH UR: ABNORMAL
UROBILINOGEN UR STRIP-ACNC: 1 E.U./DL
WBC # BLD AUTO: 11 K/UL (ref 4–11)
WBC #/AREA URNS HPF: ABNORMAL /HPF (ref 0–5)

## 2024-07-31 PROCEDURE — 2060000000 HC ICU INTERMEDIATE R&B

## 2024-07-31 PROCEDURE — 99285 EMERGENCY DEPT VISIT HI MDM: CPT

## 2024-07-31 PROCEDURE — 87635 SARS-COV-2 COVID-19 AMP PRB: CPT

## 2024-07-31 PROCEDURE — 6360000002 HC RX W HCPCS: Performed by: INTERNAL MEDICINE

## 2024-07-31 PROCEDURE — 6370000000 HC RX 637 (ALT 250 FOR IP): Performed by: EMERGENCY MEDICINE

## 2024-07-31 PROCEDURE — 2580000003 HC RX 258: Performed by: EMERGENCY MEDICINE

## 2024-07-31 PROCEDURE — 36415 COLL VENOUS BLD VENIPUNCTURE: CPT

## 2024-07-31 PROCEDURE — 82803 BLOOD GASES ANY COMBINATION: CPT

## 2024-07-31 PROCEDURE — 96360 HYDRATION IV INFUSION INIT: CPT

## 2024-07-31 PROCEDURE — 71045 X-RAY EXAM CHEST 1 VIEW: CPT

## 2024-07-31 PROCEDURE — 6370000000 HC RX 637 (ALT 250 FOR IP): Performed by: INTERNAL MEDICINE

## 2024-07-31 PROCEDURE — 81001 URINALYSIS AUTO W/SCOPE: CPT

## 2024-07-31 PROCEDURE — 2580000003 HC RX 258: Performed by: INTERNAL MEDICINE

## 2024-07-31 PROCEDURE — 84484 ASSAY OF TROPONIN QUANT: CPT

## 2024-07-31 PROCEDURE — 93005 ELECTROCARDIOGRAM TRACING: CPT | Performed by: EMERGENCY MEDICINE

## 2024-07-31 PROCEDURE — 80053 COMPREHEN METABOLIC PANEL: CPT

## 2024-07-31 PROCEDURE — 85025 COMPLETE CBC W/AUTO DIFF WBC: CPT

## 2024-07-31 RX ORDER — GLIPIZIDE 10 MG/1
10 TABLET ORAL
Status: DISCONTINUED | OUTPATIENT
Start: 2024-08-01 | End: 2024-08-01 | Stop reason: HOSPADM

## 2024-07-31 RX ORDER — INSULIN LISPRO 100 [IU]/ML
0-4 INJECTION, SOLUTION INTRAVENOUS; SUBCUTANEOUS NIGHTLY
Status: DISCONTINUED | OUTPATIENT
Start: 2024-07-31 | End: 2024-08-01 | Stop reason: HOSPADM

## 2024-07-31 RX ORDER — INSULIN GLARGINE 100 [IU]/ML
78 INJECTION, SOLUTION SUBCUTANEOUS NIGHTLY
Status: DISCONTINUED | OUTPATIENT
Start: 2024-08-01 | End: 2024-08-01 | Stop reason: HOSPADM

## 2024-07-31 RX ORDER — ASPIRIN 81 MG/1
81 TABLET ORAL DAILY
Status: DISCONTINUED | OUTPATIENT
Start: 2024-08-01 | End: 2024-08-01 | Stop reason: HOSPADM

## 2024-07-31 RX ORDER — ALOGLIPTIN 12.5 MG/1
25 TABLET, FILM COATED ORAL DAILY
Status: DISCONTINUED | OUTPATIENT
Start: 2024-08-01 | End: 2024-08-01 | Stop reason: HOSPADM

## 2024-07-31 RX ORDER — SODIUM CHLORIDE 0.9 % (FLUSH) 0.9 %
5-40 SYRINGE (ML) INJECTION EVERY 12 HOURS SCHEDULED
Status: DISCONTINUED | OUTPATIENT
Start: 2024-07-31 | End: 2024-08-01 | Stop reason: HOSPADM

## 2024-07-31 RX ORDER — FLUCONAZOLE 100 MG/1
150 TABLET ORAL
Status: DISCONTINUED | OUTPATIENT
Start: 2024-08-03 | End: 2024-08-01 | Stop reason: HOSPADM

## 2024-07-31 RX ORDER — ASPIRIN 81 MG/1
324 TABLET, CHEWABLE ORAL ONCE
Status: COMPLETED | OUTPATIENT
Start: 2024-07-31 | End: 2024-07-31

## 2024-07-31 RX ORDER — HYDROCHLOROTHIAZIDE 25 MG/1
12.5 TABLET ORAL EVERY MORNING
Status: DISCONTINUED | OUTPATIENT
Start: 2024-08-01 | End: 2024-08-01 | Stop reason: HOSPADM

## 2024-07-31 RX ORDER — ESCITALOPRAM OXALATE 10 MG/1
10 TABLET ORAL DAILY
Status: DISCONTINUED | OUTPATIENT
Start: 2024-08-01 | End: 2024-08-01 | Stop reason: HOSPADM

## 2024-07-31 RX ORDER — ATORVASTATIN CALCIUM 10 MG/1
20 TABLET, FILM COATED ORAL NIGHTLY
Status: DISCONTINUED | OUTPATIENT
Start: 2024-08-01 | End: 2024-08-01 | Stop reason: HOSPADM

## 2024-07-31 RX ORDER — SODIUM CHLORIDE 9 MG/ML
INJECTION, SOLUTION INTRAVENOUS PRN
Status: DISCONTINUED | OUTPATIENT
Start: 2024-07-31 | End: 2024-08-01 | Stop reason: HOSPADM

## 2024-07-31 RX ORDER — LISINOPRIL 10 MG/1
20 TABLET ORAL DAILY
Status: DISCONTINUED | OUTPATIENT
Start: 2024-08-01 | End: 2024-08-01 | Stop reason: HOSPADM

## 2024-07-31 RX ORDER — 0.9 % SODIUM CHLORIDE 0.9 %
1000 INTRAVENOUS SOLUTION INTRAVENOUS ONCE
Status: COMPLETED | OUTPATIENT
Start: 2024-07-31 | End: 2024-07-31

## 2024-07-31 RX ORDER — ONDANSETRON 4 MG/1
4 TABLET, ORALLY DISINTEGRATING ORAL EVERY 8 HOURS PRN
Status: DISCONTINUED | OUTPATIENT
Start: 2024-07-31 | End: 2024-08-01 | Stop reason: HOSPADM

## 2024-07-31 RX ORDER — POLYETHYLENE GLYCOL 3350 17 G/17G
17 POWDER, FOR SOLUTION ORAL DAILY PRN
Status: DISCONTINUED | OUTPATIENT
Start: 2024-07-31 | End: 2024-08-01 | Stop reason: HOSPADM

## 2024-07-31 RX ORDER — ENOXAPARIN SODIUM 100 MG/ML
30 INJECTION SUBCUTANEOUS 2 TIMES DAILY
Status: DISCONTINUED | OUTPATIENT
Start: 2024-07-31 | End: 2024-08-01 | Stop reason: HOSPADM

## 2024-07-31 RX ORDER — INSULIN LISPRO 100 [IU]/ML
0-8 INJECTION, SOLUTION INTRAVENOUS; SUBCUTANEOUS
Status: DISCONTINUED | OUTPATIENT
Start: 2024-08-01 | End: 2024-08-01 | Stop reason: HOSPADM

## 2024-07-31 RX ORDER — SODIUM CHLORIDE 0.9 % (FLUSH) 0.9 %
5-40 SYRINGE (ML) INJECTION PRN
Status: DISCONTINUED | OUTPATIENT
Start: 2024-07-31 | End: 2024-08-01 | Stop reason: HOSPADM

## 2024-07-31 RX ORDER — ONDANSETRON 2 MG/ML
4 INJECTION INTRAMUSCULAR; INTRAVENOUS EVERY 6 HOURS PRN
Status: DISCONTINUED | OUTPATIENT
Start: 2024-07-31 | End: 2024-08-01 | Stop reason: HOSPADM

## 2024-07-31 RX ORDER — PANTOPRAZOLE SODIUM 40 MG/1
40 TABLET, DELAYED RELEASE ORAL 2 TIMES DAILY
Status: DISCONTINUED | OUTPATIENT
Start: 2024-07-31 | End: 2024-08-01 | Stop reason: HOSPADM

## 2024-07-31 RX ADMIN — Medication 10 ML: at 21:30

## 2024-07-31 RX ADMIN — PANTOPRAZOLE SODIUM 40 MG: 40 TABLET, DELAYED RELEASE ORAL at 21:30

## 2024-07-31 RX ADMIN — ASPIRIN 324 MG: 81 TABLET, CHEWABLE ORAL at 17:35

## 2024-07-31 RX ADMIN — SODIUM CHLORIDE 1000 ML: 9 INJECTION, SOLUTION INTRAVENOUS at 16:09

## 2024-07-31 RX ADMIN — ENOXAPARIN SODIUM 30 MG: 100 INJECTION SUBCUTANEOUS at 21:30

## 2024-07-31 ASSESSMENT — PAIN - FUNCTIONAL ASSESSMENT
PAIN_FUNCTIONAL_ASSESSMENT: 0-10
PAIN_FUNCTIONAL_ASSESSMENT: 0-10

## 2024-07-31 ASSESSMENT — ENCOUNTER SYMPTOMS
TROUBLE SWALLOWING: 0
COLOR CHANGE: 0
WHEEZING: 0
ABDOMINAL PAIN: 0
STRIDOR: 0
BACK PAIN: 0
VOMITING: 0
FACIAL SWELLING: 0
SHORTNESS OF BREATH: 0
VOICE CHANGE: 0
PHOTOPHOBIA: 0
NAUSEA: 0
BLOOD IN STOOL: 0

## 2024-07-31 ASSESSMENT — PAIN SCALES - GENERAL
PAINLEVEL_OUTOF10: 0
PAINLEVEL_OUTOF10: 0

## 2024-07-31 ASSESSMENT — LIFESTYLE VARIABLES
HOW MANY STANDARD DRINKS CONTAINING ALCOHOL DO YOU HAVE ON A TYPICAL DAY: PATIENT DOES NOT DRINK
HOW OFTEN DO YOU HAVE A DRINK CONTAINING ALCOHOL: NEVER

## 2024-07-31 NOTE — ED PROVIDER NOTES
[facility] EMERGENCY DEPART[facility]  eMERGENCY dEPARTMENT eNCOUnter      Pt Name: Manjinder Choi  MRN: [id number]  Birthdate 1970  Date of evaluation: 7/31/2024  Provider: Adama Perdue MD    CHIEF COMPLAINT       Chief Complaint   Patient presents with    Fatigue     Fatigue for 3 to 4 weeks.  Has seen PCP and was in the ER ([facility]) 2 days ago.  Has been thinking that it was sugar related.  Dexcom isn't always matching his FSBS readings at the same time.           HISTORY OF PRESENT ILLNESS   (Location/Symptom, Timing/Onset, Context/Setting, Quality, Duration, Modifying Factors, Severity)  Note limiting factors.     History obtained from: The patient and multiple family members    Manjinder Choi is a 54 y.o. male who reports his 3 of CAD and 2 previous TIAs and reports that he does have decreased sensation on the right side of his face arm and leg ever since his TIAs.  Patient reports that he presents due to severe fatigue to the point where he is falling asleep during the day as well as further decrease in sensation on the right side of his face arm and leg from his baseline.  Patient reports all symptoms have been ongoing for approximately 4 weeks.  Patient reports 2 days ago he went to [facility] emergency department and had a workup which did show elevated blood sugars and CTs of the head and neck which showed an area of encephalomalacia in the left inferior cerebellar hemisphere.  It was advised that the patient call his neurologist as an outpatient but patient has not yet attempted to call or make an appoint with his neurologist.  Patient reports symptoms have continued to worsen over the past 2 days and therefore he came to the emergency department for reevaluation.  Patient denies any fevers cough runny nose nausea vomiting sore throat or infectious symptoms.  Patient denies any chest pain.    HPI    Nursing Notes were reviewed.    REVIEW OFSYSTEMS    (2-9 systems for level 4, 10  care.      FINAL IMPRESSION      1. Right sided weakness    2. Fatigue, unspecified type          DISPOSITION/PLAN     DISPOSITION Admitted 07/31/2024 05:51:16 PM          (Please note that portions of this note were completed with a voice recognition program.  Efforts were made to edit the dictations but occasionally words are mis-transcribed.)    Admaa Perdue MD (electronically signed)  Attending Emergency Physician           Adama Perdue MD  07/31/24 [id number]

## 2024-07-31 NOTE — PLAN OF CARE
Principal Problem:    TIA (transient ischemic attack)  Active Problems:    Type 2 diabetes mellitus, without long-term current use of insulin (HCC)    Coronary artery disease involving native coronary artery of native heart without angina pectoris    Mixed hyperlipidemia    Essential (primary) hypertension  Resolved Problems:    * No resolved hospital problems. *        Patient with prior history of strokes presented to [facility] 2 days ago with vague neurological deficits and unable to care for himself.  CT and CTA of head and neck was negative.  He was sent home.  He has been asked to follow-up with neurology as an outpatient.  Family brought him back to [facility] because he has had some vague sensory changes feel he is not safe to be home alone.    He is being admitted to the hospital.  Continue home meds.  Will order neurology consult.  Repeat MRI of the brain.  I suspect he will need a SNF.      ROSALIO OTT MD 7/31/2024 5:52 PM

## 2024-07-31 NOTE — TELEPHONE ENCOUNTER
FYI: Family called and wanted us to know that they are taking him to a bigger hospital. I am assuming you know what they are talking about.

## 2024-08-01 ENCOUNTER — APPOINTMENT (OUTPATIENT)
Dept: MRI IMAGING | Age: 54
End: 2024-08-01
Payer: COMMERCIAL

## 2024-08-01 VITALS
BODY MASS INDEX: 42.69 KG/M2 | SYSTOLIC BLOOD PRESSURE: 138 MMHG | OXYGEN SATURATION: 93 % | WEIGHT: 298.19 LBS | TEMPERATURE: 97.2 F | HEART RATE: 62 BPM | RESPIRATION RATE: 18 BRPM | HEIGHT: 70 IN | DIASTOLIC BLOOD PRESSURE: 82 MMHG

## 2024-08-01 PROBLEM — R53.1 RIGHT SIDED WEAKNESS: Status: ACTIVE | Noted: 2024-08-01

## 2024-08-01 LAB
25(OH)D3 SERPL-MCNC: 18.3 NG/ML
ANION GAP SERPL CALCULATED.3IONS-SCNC: 11 MMOL/L (ref 3–16)
BUN SERPL-MCNC: 16 MG/DL (ref 7–20)
CALCIUM SERPL-MCNC: 8.8 MG/DL (ref 8.3–10.6)
CHLORIDE SERPL-SCNC: 99 MMOL/L (ref 99–110)
CHOLEST SERPL-MCNC: 159 MG/DL (ref 0–199)
CO2 SERPL-SCNC: 24 MMOL/L (ref 21–32)
CREAT SERPL-MCNC: 0.6 MG/DL (ref 0.9–1.3)
DEPRECATED RDW RBC AUTO: 14.2 % (ref 12.4–15.4)
EKG ATRIAL RATE: 63 BPM
EKG DIAGNOSIS: NORMAL
EKG P AXIS: 40 DEGREES
EKG P-R INTERVAL: 192 MS
EKG Q-T INTERVAL: 418 MS
EKG QRS DURATION: 94 MS
EKG QTC CALCULATION (BAZETT): 427 MS
EKG R AXIS: 29 DEGREES
EKG T AXIS: 0 DEGREES
EKG VENTRICULAR RATE: 63 BPM
EST. AVERAGE GLUCOSE BLD GHB EST-MCNC: 266.1 MG/DL
FOLATE SERPL-MCNC: 13.57 NG/ML (ref 4.78–24.2)
GFR SERPLBLD CREATININE-BSD FMLA CKD-EPI: >90 ML/MIN/{1.73_M2}
GLUCOSE BLD-MCNC: 200 MG/DL (ref 70–99)
GLUCOSE BLD-MCNC: 208 MG/DL (ref 70–99)
GLUCOSE BLD-MCNC: 210 MG/DL (ref 70–99)
GLUCOSE BLD-MCNC: 267 MG/DL (ref 70–99)
GLUCOSE SERPL-MCNC: 234 MG/DL (ref 70–99)
HBA1C MFR BLD: 10.9 %
HCT VFR BLD AUTO: 41.4 % (ref 40.5–52.5)
HDLC SERPL-MCNC: 31 MG/DL (ref 40–60)
HGB BLD-MCNC: 13.5 G/DL (ref 13.5–17.5)
LDLC SERPL CALC-MCNC: 104 MG/DL
MCH RBC QN AUTO: 27 PG (ref 26–34)
MCHC RBC AUTO-ENTMCNC: 32.5 G/DL (ref 31–36)
MCV RBC AUTO: 83.1 FL (ref 80–100)
PERFORMED ON: ABNORMAL
PLATELET # BLD AUTO: 270 K/UL (ref 135–450)
PMV BLD AUTO: 7.8 FL (ref 5–10.5)
POTASSIUM SERPL-SCNC: 4.1 MMOL/L (ref 3.5–5.1)
RBC # BLD AUTO: 4.99 M/UL (ref 4.2–5.9)
SODIUM SERPL-SCNC: 134 MMOL/L (ref 136–145)
TRIGL SERPL-MCNC: 121 MG/DL (ref 0–150)
TSH SERPL DL<=0.005 MIU/L-ACNC: 2.78 UIU/ML (ref 0.27–4.2)
VIT B12 SERPL-MCNC: 854 PG/ML (ref 211–911)
VLDLC SERPL CALC-MCNC: 24 MG/DL
WBC # BLD AUTO: 8.9 K/UL (ref 4–11)

## 2024-08-01 PROCEDURE — 99223 1ST HOSP IP/OBS HIGH 75: CPT | Performed by: PSYCHIATRY & NEUROLOGY

## 2024-08-01 PROCEDURE — 80061 LIPID PANEL: CPT

## 2024-08-01 PROCEDURE — 97166 OT EVAL MOD COMPLEX 45 MIN: CPT

## 2024-08-01 PROCEDURE — 92526 ORAL FUNCTION THERAPY: CPT

## 2024-08-01 PROCEDURE — 36415 COLL VENOUS BLD VENIPUNCTURE: CPT

## 2024-08-01 PROCEDURE — 97530 THERAPEUTIC ACTIVITIES: CPT

## 2024-08-01 PROCEDURE — 84443 ASSAY THYROID STIM HORMONE: CPT

## 2024-08-01 PROCEDURE — 80048 BASIC METABOLIC PNL TOTAL CA: CPT

## 2024-08-01 PROCEDURE — 82746 ASSAY OF FOLIC ACID SERUM: CPT

## 2024-08-01 PROCEDURE — 6370000000 HC RX 637 (ALT 250 FOR IP): Performed by: INTERNAL MEDICINE

## 2024-08-01 PROCEDURE — 85027 COMPLETE CBC AUTOMATED: CPT

## 2024-08-01 PROCEDURE — 2580000003 HC RX 258: Performed by: INTERNAL MEDICINE

## 2024-08-01 PROCEDURE — 82607 VITAMIN B-12: CPT

## 2024-08-01 PROCEDURE — 99238 HOSP IP/OBS DSCHRG MGMT 30/<: CPT

## 2024-08-01 PROCEDURE — 97161 PT EVAL LOW COMPLEX 20 MIN: CPT

## 2024-08-01 PROCEDURE — APPSS45 APP SPLIT SHARED TIME 31-45 MINUTES

## 2024-08-01 PROCEDURE — 82306 VITAMIN D 25 HYDROXY: CPT

## 2024-08-01 PROCEDURE — 93010 ELECTROCARDIOGRAM REPORT: CPT | Performed by: INTERNAL MEDICINE

## 2024-08-01 PROCEDURE — 6360000002 HC RX W HCPCS: Performed by: INTERNAL MEDICINE

## 2024-08-01 PROCEDURE — 92610 EVALUATE SWALLOWING FUNCTION: CPT

## 2024-08-01 PROCEDURE — 97535 SELF CARE MNGMENT TRAINING: CPT

## 2024-08-01 PROCEDURE — 83036 HEMOGLOBIN GLYCOSYLATED A1C: CPT

## 2024-08-01 PROCEDURE — 70551 MRI BRAIN STEM W/O DYE: CPT

## 2024-08-01 RX ORDER — GLUCAGON 1 MG/ML
1 KIT INJECTION PRN
Status: DISCONTINUED | OUTPATIENT
Start: 2024-08-01 | End: 2024-08-01 | Stop reason: HOSPADM

## 2024-08-01 RX ORDER — DEXTROSE MONOHYDRATE 100 MG/ML
INJECTION, SOLUTION INTRAVENOUS CONTINUOUS PRN
Status: DISCONTINUED | OUTPATIENT
Start: 2024-08-01 | End: 2024-08-01 | Stop reason: HOSPADM

## 2024-08-01 RX ADMIN — PANTOPRAZOLE SODIUM 40 MG: 40 TABLET, DELAYED RELEASE ORAL at 11:32

## 2024-08-01 RX ADMIN — ALOGLIPTIN 25 MG: 12.5 TABLET, FILM COATED ORAL at 11:32

## 2024-08-01 RX ADMIN — INSULIN GLARGINE 78 UNITS: 100 INJECTION, SOLUTION SUBCUTANEOUS at 02:24

## 2024-08-01 RX ADMIN — INSULIN LISPRO 2 UNITS: 100 INJECTION, SOLUTION INTRAVENOUS; SUBCUTANEOUS at 13:46

## 2024-08-01 RX ADMIN — HYDROCHLOROTHIAZIDE 12.5 MG: 25 TABLET ORAL at 11:32

## 2024-08-01 RX ADMIN — ASPIRIN 81 MG: 81 TABLET, COATED ORAL at 11:32

## 2024-08-01 RX ADMIN — ESCITALOPRAM OXALATE 10 MG: 10 TABLET ORAL at 11:32

## 2024-08-01 RX ADMIN — INSULIN LISPRO 2 UNITS: 100 INJECTION, SOLUTION INTRAVENOUS; SUBCUTANEOUS at 17:33

## 2024-08-01 RX ADMIN — ENOXAPARIN SODIUM 30 MG: 100 INJECTION SUBCUTANEOUS at 11:34

## 2024-08-01 RX ADMIN — ATORVASTATIN CALCIUM 20 MG: 10 TABLET, FILM COATED ORAL at 02:24

## 2024-08-01 RX ADMIN — LISINOPRIL 20 MG: 10 TABLET ORAL at 11:28

## 2024-08-01 RX ADMIN — EMPAGLIFLOZIN 25 MG: 10 TABLET, FILM COATED ORAL at 11:32

## 2024-08-01 RX ADMIN — Medication 10 ML: at 11:34

## 2024-08-01 ASSESSMENT — LIFESTYLE VARIABLES
HOW OFTEN DO YOU HAVE A DRINK CONTAINING ALCOHOL: NEVER
HOW MANY STANDARD DRINKS CONTAINING ALCOHOL DO YOU HAVE ON A TYPICAL DAY: PATIENT DOES NOT DRINK

## 2024-08-01 NOTE — PROGRESS NOTES
Inpatient Physical Therapy Evaluation & Treatment    Unit: PCU  Date:  8/1/2024  Patient Name:    Manjinder Choi  Admitting diagnosis:  TIA (transient ischemic attack) [G45.9]  Right sided weakness [R53.1]  Fatigue, unspecified type [R53.83]  Admit Date:  7/31/2024  Precautions/Restrictions/WB Status/ Lines/ Wounds/ Oxygen: Fall risk, Lines (IV), and Telemetry      Pt seen for cotreatment this date due to patient safety, patient endurance, and acute illness/injury    Treatment Time:  9:05-9:55  Treatment Number:  1   Timed Code Treatment Minutes: 42 minutes  Total Treatment Minutes:  50  minutes    Patient Stated Goals for Therapy: \" to stay strong \"          Discharge Recommendations: Home with PRN assistance  DME needs for discharge: Needs Met       Therapy recommendation for EMS Transport: can transport by wheelchair    Therapy recommendations for staff:   Independent for ambulation with use of No AD within room    History of Present Illness:   \"Chief Complaint: TIA (transient ischemic attack)  Manjinder Choi is a 54 y.o. male who presents with generalised weakness.pt has been falling asleep easily fels weak does not feel like has energy getting out of bed.  Has been gaining some weight otherwise no acute complaints  Denied any fevers, chills, CP, SOB, cough, N/V, abdominal pain, C/D or urinary changes. Denied any tobacco or alcohol use smokes marijuana occasionally\"    PMHx   Past Medical History[]Expand by Default        Past Medical History:   Diagnosis Date    Coronary artery disease involving native heart without angina pectoris      Gastro-esophageal reflux disease without esophagitis 10/24/2017    Mixed hyperglyceridemia      Nonrheumatic tricuspid valve regurgitation      Sleep apnea      TIA (transient ischemic attack)       2019 & 2020    Tobacco use 10/24/2017            PSHX:  has a past surgical history that includes Rotator cuff repair; Colonoscopy; and Cardiac surgery.    AM-PAC Mobility Score         Lower Extremity Sensation    Diminished entire R side     Coordination  WFL    Neuro  Coordination Testing WFL,observed neurologist assessment.    Tone  WNL    Balance  Static Sitting:  Normal; Independent  Dynamic Sitting:  Normal; Independent   Comments:     Static Standing: Good ; Independent  Dynamic Standing: Good ; Independent  Comments: no overt LOB,occasional stepping outside of MANUELITO. PAtient reports some difficulty with some higher balance challenges. Discussed returning to OP PT for assessment.    Posture  Seated:  WFL  Standing:  WFL    Bed Mobility   Supine to Sit:    Not Tested  Sit to Supine:   Not Tested  Rolling:   Not Tested   Scooting in sitting: Independent   Scooting in supine:  Not Tested   Bridging:  Not Tested  Comments:     Transfer Training     Sit to stand:   Independent   Stand to sit:   Independent   Bed to/from Chair:  Not Tested with use of N/A  Comments:     Gait gait completed as indicated below  Distance:      225 ft  Deviations (firm surface/linoleum):  quickened blanca, step through pattern, and deviated path  Assistive Device Used:    No AD and gait belt  Level of Assist:    Independent   Comment: no LOB, occasionally deviated     Stair Training stairs completed as indicated below  # of Steps:   4  Level of Assist:  Independent   UE Support:  bilateral  Assistive Device:  No AD  Pattern:   non-reciprocal pattern  Comments: steady,safe       Other Activities  Neurologist assessed patient at beginning of session.  RN made aware of patient complaints of falling asleep.    Patient/Family Education   Pt educated on role of inpatient PT, POC, importance of continued activity, DC recommendations.      Assessment  Pt seen today for physical therapy Evaluation, Treatment, & Discharge Summary.. Balance safety and sequencing were good. Patient may benefit from further assessment of high level balance as an outpatient. Recommend home with prn assist,OP PT.        Goals : (both goals  met)  To be met in 3 visits:  1). Independent ambulation on level surfaces.  2). Independent ambulation up/down steps with B handrails.              Plan    DC skilled PT.    Signature:Billie Espino, PT #[id number]     If patient discharges from this facility prior to next visit, this note will serve as the Discharge Summary.    CHF Education  N/A

## 2024-08-01 NOTE — ED NOTES
Report called to Coleen KELSEY on PCU.  All questions an concerns addressed.  Pt in stable condition.  All aware transport will not be here until 10:50pm.  Cha Milner RN

## 2024-08-01 NOTE — CONSULTS
Inpatient Neurology consult        [facility]            Manjinder Choi  1970    Date of Service: 8/1/2024    Referring Physician: Deena Boogie,*      Reason for the consult and CC: TIA     HPI:   The patient is a 54 y.o. male with a past medical history of CAD, GERD, HLD, and TIAs originally presenting to the hospital for concerns of severe fatigue and lethargy onset 4 weeks. Patient reports residual right sided weakness from previous TIAs in the past but denies nay worsening symptoms.  Patient reports 2 days ago he went to [facility] emergency department and had a workup which did show elevated blood sugars and CTs of the head and neck which showed an area of encephalomalacia in the left inferior cerebellar hemisphere. It was advised that the patient call his neurologist as an outpatient but patient has not yet attempted to call or make an appoint with his neurologist. Patient reports symptoms have continued to worsen over the past 2 days and therefore he came to the emergency department for reevaluation. Neurology has been consulted for further evaluation and management of possible TIA.        Constitutional:   Vitals:    08/01/24 0057 08/01/24 0139 08/01/24 0712 08/01/24 1128   BP: (!) 146/82 (!) 145/84 124/74 (!) 157/85   Pulse: 56 60 61 65   Resp: 16 16 16 18   Temp:  97.1 °F (36.2 °C) 97 °F (36.1 °C) 97 °F (36.1 °C)   TempSrc:  Oral Oral Oral   SpO2: 95% 96% 93% 93%   Weight:  135.3 kg (298 lb 3 oz)     Height:  1.778 m (5' 10\")           I personally reviewed and updated social history, past medical history, medications, allergy, surgical history, and family history as documented in the patient's electronic health records.       ROS: 10-14 ROS reviewed with the patient/nurse/family which were unremarkable except mentioned in H&P.    General appearance:  Normal development and appear in no acute distress.   Mental Status:   Oriented to person, place,    [x] All available Consultant notes from yesterday/today were reviewed  [x] All current labs were reviewed and interpreted for clinical significance   [x] Appropriate follow-up labs were ordered    Data: reviewed   LABS:   Lab Results   Component Value Date/Time     07/31/2024 03:36 PM    K 4.4 07/31/2024 03:36 PM    CL 99 07/31/2024 03:36 PM    CO2 28 07/31/2024 03:36 PM    BUN 16 07/31/2024 03:36 PM    CREATININE <0.5 07/31/2024 03:36 PM    GFRAA >60 05/12/2022 10:22 AM    LABGLOM >90 07/31/2024 03:36 PM    LABGLOM >60 03/11/2024 07:12 AM    GLUCOSE 232 07/31/2024 03:36 PM    GLUCOSE 195 08/29/2023 06:06 PM    CALCIUM 9.5 07/31/2024 03:36 PM     Lab Results   Component Value Date/Time    WBC 8.9 08/01/2024 07:30 AM    RBC 4.99 08/01/2024 07:30 AM    RBC 4.46 08/29/2023 06:06 PM    HGB 13.5 08/01/2024 07:30 AM    HCT 41.4 08/01/2024 07:30 AM    MCV 83.1 08/01/2024 07:30 AM    RDW 14.2 08/01/2024 07:30 AM     08/01/2024 07:30 AM     Lab Results   Component Value Date    INR 1.00 (L) 08/29/2023    PROTIME 10.3 08/29/2023         Impression:  Generalized fatigue  Lethargy   Possible TIA    Patient non-compliant with home CPAP. Fatigue and lethargy most likely secondary to TOBIN and non-compliance     Recommendation:  Continue ASA and statin therapy   MRI Brain pending    ST        Thank you for referring such patient. If you have any questions regarding my consult note, please don't hesitate to call me.     HOWARD Edwards NP    This dictation was generated by voice recognition computer software. Although all attempts are made to edit the dictation for accuracy, there may be errors in the  transcription that are not intended    Addendum:  The patient has a history of 2 strokes in the past.  Per the patient, the patient remains to have residual right sided numbness and left ataxia after the CVA.  The patient has no clear new neurological deficit during my assessment.  The MRI brain showed no evidence

## 2024-08-01 NOTE — H&P
V2.0  History and Physical      Name:  Manjinder Choi /Age/Sex: 1970  (54 y.o. male)   MRN & CSN:  [id number] & [id number] Encounter Date/Time: 2024 7:19 AM EDT   Location:  /0308-01 PCP: Sal Esqueda APRN - CNP       Hospital Day: 2    Assessment and Plan:     Patient is a 54 y.o. male who presented with generalized weakness    Generalised weakness  - can be mlultifactorial  - no evidence of infection so far  - Check TSH vitamin b12, vitamin d levels  - pt not compliant with CPAP which may be contributing as pt falls asleep easily    H/o CVA  - pt states has h/o r sided weakness, during my assessment no focal deficits  -CT head and cta head were recently done had L cerebellar encephalomalacia  -neurology consult  -mri brain wo contrast ordered    Morbid obesity  - counselled on weight loss    CAD s/p pc    -Continue aspirin, statin, beta-blocker    T2DM  - BS on admit 232  - Basal/bolus and LCSI  - Hold PO meds   - Hypoglycemic protocol         HTN  -Continue home meds    HLD  -Continue statin     GERD  -Continue PPI      Checklist:  Advanced directive: full  Diet: cardiac  DVT ppx: Lovenox  Sugar: BG goal of 140-180 while inpatient    Disposition: admit to inpatient.  Estimated discharge: 2 day(s).  Current living situation: home.  Expected disposition: home.    Spoke with ED provider who recommended admission for the patient and I agree with that plan.  Personally reviewed lab studies and imaging.  EKG interpreted personally and results as stated above.  Imaging that was interpreted personally and results as stated above.      Comment: Please note this report has been produced using speech recognition software and may contain errors related to that system including errors in grammar, punctuation, and spelling, as well as words and phrases that may be inappropriate. If there are any questions or concerns please feel free to contact the dictating provider for clarification.         History  fluconazole  150 mg Oral Q72H    [Held by provider] glipiZIDE  10 mg Oral BID AC    hydroCHLOROthiazide  12.5 mg Oral QAM    insulin glargine  78 Units SubCUTAneous Nightly    empagliflozin  25 mg Oral Daily    alogliptin  25 mg Oral Daily    lisinopril  20 mg Oral Daily    pantoprazole  40 mg Oral BID    sodium chloride flush  5-40 mL IntraVENous 2 times per day    enoxaparin  30 mg SubCUTAneous BID    insulin lispro  0-8 Units SubCUTAneous TID WC    insulin lispro  0-4 Units SubCUTAneous Nightly      Infusions:    sodium chloride       PRN Meds: sodium chloride flush, 5-40 mL, PRN  sodium chloride, , PRN  ondansetron, 4 mg, Q8H PRN   Or  ondansetron, 4 mg, Q6H PRN  polyethylene glycol, 17 g, Daily PRN        Labs      CBC:   Recent Labs     07/29/24  0853 07/31/24  1536   WBC 10.8 11.0   HGB 13.0* 13.9    302     BMP:    Recent Labs     07/29/24  0853 07/31/24  1536    136   K 4.0 4.4   CL 99 99   CO2 29 28   BUN 19 16   CREATININE 0.7 <0.5*   GLUCOSE 268 232*     Hepatic:   Recent Labs     07/29/24  0853 07/31/24  1536   AST 20 18   ALT 29 37   BILITOT 0.5 0.5   ALKPHOS 81 105     Lipids:   Lab Results   Component Value Date/Time    CHOL 151 06/24/2024 11:37 AM    HDL 35 06/24/2024 11:37 AM    TRIG 225 06/24/2024 11:37 AM     Hemoglobin A1C:   Lab Results   Component Value Date/Time    LABA1C 10.8 06/24/2024 11:37 AM     TSH:   Lab Results   Component Value Date/Time    TSH 3.180 07/29/2024 08:53 AM     Troponin: No results found for: \"TROPONINT\"  Lactic Acid: No results for input(s): \"LACTA\" in the last 72 hours.  BNP: No results for input(s): \"PROBNP\" in the last 72 hours.  UA:  Lab Results   Component Value Date/Time    NITRU Negative 07/31/2024 02:30 PM    COLORU Yellow 07/31/2024 02:30 PM    PHUR 6.0 07/31/2024 02:30 PM    PHUR 6.5 07/29/2024 08:58 AM    WBCUA 0-2 07/31/2024 02:30 PM    RBCUA 0-2 07/31/2024 02:30 PM    BACTERIA Rare 07/31/2024 02:30 PM    CLARITYU Clear 07/31/2024 02:30 PM     LEUKOCYTESUR TRACE 07/31/2024 02:30 PM    UROBILINOGEN 1.0 07/31/2024 02:30 PM    BILIRUBINUR Negative 07/31/2024 02:30 PM    BLOODU Negative 07/31/2024 02:30 PM    GLUCOSEU 250 07/31/2024 02:30 PM    KETUA Negative 07/31/2024 02:30 PM     Urine Cultures: No results found for: \"LABURIN\"  Blood Cultures: No results found for: \"BC\"  No results found for: \"BLOODCULT2\"  Organism: No results found for: \"ORG\"    Imaging/Diagnostics Last 24 Hours   XR CHEST PORTABLE    Result Date: 7/31/2024  EXAMINATION: ONE XRAY VIEW OF THE CHEST 7/31/2024 1:34 pm COMPARISON: None. HISTORY: ORDERING SYSTEM PROVIDED HISTORY: fatigue TECHNOLOGIST PROVIDED HISTORY: Reason for exam:->fatigue Reason for Exam: fatigue FINDINGS: The lungs are without acute focal process.  There is no effusion or pneumothorax. The cardiomediastinal silhouette is without acute process. The osseous structures are without acute process.     No acute process.         Electronically signed by Heather May MD on 8/1/2024 at 7:19 AM

## 2024-08-01 NOTE — ED NOTES
[facility] called due to being late. [facility] advised that [facility] could not cover this run. [facility] called and will be enroute to get pt in 30-60 minutes. Pt updated and agreeable with plan.

## 2024-08-01 NOTE — ACP (ADVANCE CARE PLANNING)
Advance Care Planning     General Advance Care Planning (ACP) Conversation    Date of Conversation: 8/1/2024  Conducted with: Patient with Decision Making Capacity  Other persons present: None    Healthcare Decision Maker: No healthcare decision makers have been documented.  Click here to complete HealthCare Decision Makers including selection of the Healthcare Decision Maker Relationship (ie \"Primary\")       Content/Action Overview:  DECLINED ACP Conversation - will revisit periodically  Reviewed DNR/DNI and patient elects Full Code (Attempt Resuscitation)        Length of Voluntary ACP Conversation in minutes:  <16 minutes (Non-Billable)    Maritza Torrez RN

## 2024-08-01 NOTE — PROGRESS NOTES
Pt discharged home in stable condition via wheelchair to lobby and left in private vehicle with family member.

## 2024-08-01 NOTE — FLOWSHEET NOTE
08/01/24 0139   Vital Signs   Temp 97.1 °F (36.2 °C)   Temp Source Oral   Pulse 60   Heart Rate Source Monitor   Respirations 16   BP (!) 145/84   MAP (Calculated) 104   BP Location Right lower arm   BP Method Automatic   Patient Position Semi fowlers   Oxygen Therapy   SpO2 96 %   O2 Device None (Room air)   Height and Weight   Height 1.778 m (5' 10\")   Weight - Scale 135.3 kg (298 lb 3 oz)   BSA (Calculated - sq m) 2.58 sq meters   BMI (Calculated) 42.9   Rhythm Interpretation   Cardiac Rhythm Sinus rhythm     Admission Assessment completed. Scheduled medications given per MAR, On Room Air, A&O X4, Vital Signs completed and Charted, Patient denies any further needs at this time. Call light within reach, Reminded patient to call RN if he needs anything.

## 2024-08-01 NOTE — CARE COORDINATION
Case Management Assessment  Initial Evaluation    Date/Time of Evaluation: 8/1/2024 8:56 AM  Assessment Completed by: Maritza Torrez RN    If patient is discharged prior to next notation, then this note serves as note for discharge by case management.    Patient Name: Manjinder Choi                   YOB: 1970  Diagnosis: TIA (transient ischemic attack) [G45.9]  Right sided weakness [R53.1]  Fatigue, unspecified type [R53.83]                   Date / Time: 7/31/2024  2:11 PM    Patient Admission Status: Inpatient   Readmission Risk (Low < 19, Mod (19-27), High > 27): Readmission Risk Score: 5.9    Current PCP: Sal Esqueda APRN - CNP  PCP verified by CM? Yes    Chart Reviewed: Yes      History Provided by: Patient  Patient Orientation: Alert and Oriented    Patient Cognition: Alert    Hospitalization in the last 30 days (Readmission):  No    If yes, Readmission Assessment in CM Navigator will be completed.    Advance Directives:      Code Status: Full Code   Patient's Primary Decision Maker is: Legal Next of Kin      Discharge Planning:    Patient lives with: Alone (daughters are there 5 days a week) Type of Home: Apartment  Primary Care Giver: Self  Patient Support Systems include: Children, Family Members   Current Financial resources: Medicaid  Current community resources: None  Current services prior to admission: Home Bipap            Current DME:              Type of Home Care services:  None    ADLS  Prior functional level: Independent in ADLs/IADLs  Current functional level: Independent in ADLs/IADLs    PT AM-PAC:   /24  OT AM-PAC:   /24    Family can provide assistance at DC: Yes  Would you like Case Management to discuss the discharge plan with any other family members/significant others, and if so, who? No  Plans to Return to Present Housing: Yes  Other Identified Issues/Barriers to RETURNING to current housing: na  Potential Assistance needed at discharge: N/A            Potential DME:

## 2024-08-01 NOTE — PLAN OF CARE
Problem: SLP Adult - Impaired Swallowing  Goal: By Discharge: Advance to least restrictive diet without signs or symptoms of aspiration for planned discharge setting.  See evaluation for individualized goals.  Note: SLP completed evaluation. Please refer to notes in EMR.    Lily Mccauley  Graduate Speech Therapy Student

## 2024-08-01 NOTE — DISCHARGE SUMMARY
Name:  Manjinder Choi  Room:  /0308-01  MRN:    [id number]    Discharge Summary      This discharge summary is in conjunction with a complete physical exam done on the day of discharge.    Discharging Provider: Madelin Farrell PA-C   Discharging Attending Physician: Dr. Sánchez       Admit: 7/31/2024  Discharge:  8/1/2024    HPI taken from admission H&P:    Manjinder Choi is a 54 y.o. male who presents with generalised weakness.pt has been falling asleep easily fels weak does not feel like has energy getting out of bed.  Has been gaining some weight otherwise no acute complaints  Denied any fevers, chills, CP, SOB, cough, N/V, abdominal pain, C/D or urinary changes. Denied any tobacco or alcohol use smokes marijuana occasionally     Diagnoses this Admission and Hospital Course   Generalized weakness  - could be multifactorial  - no evidence of infection so far  - TSH wnl   - Check vitamin b12 and folate -> wnl; vitamin D level 18.3   - pt not compliant with CPAP which may be contributing as pt falls asleep easily  - PT/OT -> rec home with prn assistance   - advise pt to follow up outpt with pulm/sleep medicine to discuss resuming CPAP vs alternative options     TIA r/o  Hx of CVA   - pt states has h/o r sided weakness, during my assessment no focal deficits  - CT head [facility] at [facility] as above with area of encephalomalacia within the left inferior cerebellar hemisphere   - MRI brain shows no acute infarct or other acute intracranial process, wedge-shaped area suggesting sequelae of remote PICA territory infarct  - continue aspirin and statin  - PT/OT/SLP -> rec home with prn assistance   - fall precautions  - neuro checks  - neuro consulted     Yeast infection to skin fold of abdominal area per PCP note  - continue oral diflucan      CAD s/p PCI    - on asa and statin      T2DM w/hyperglycemia   - on tradjenta and lantus  - holding jardiance and glipizide   - medium dose SSI to cover   - carb control diet  - monitor  Clear   LEUKOCYTESUR TRACE*   UROBILINOGEN 1.0   BILIRUBINUR Negative   BLOODU Negative   GLUCOSEU 250*         CULTURES  Results       Procedure Component Value Units Date/Time    Culture, Urine [[id number]]     Order Status: No result Specimen: Urine, clean catch     Urine culture clean catch [[id number]] Resulted: 08/01/24 1054    Order Status: Completed Updated: 08/01/24 1055    COVID-19, Rapid [[id number]] Collected: 07/31/24 1600    Order Status: Completed Specimen: Nasopharyngeal Swab Updated: 07/31/24 1624     SARS-CoV-2, NAAT Not Detected     Comment: Rapid NAAT:   Negative results should be treated as presumptive and,  if inconsistent with clinical signs and symptoms or necessary for  patient management, should be tested with an alternative molecular  assay. Negative results do not preclude SARS-CoV-2 infection and  should not be used as the sole basis for patient management decisions.  This test has been authorized by the FDA under an Emergency Use  Authorization (EUA) for use by authorized laboratories.    Fact sheet for Healthcare Providers:  https://www.fda.gov/media/963258/download  Fact sheet for Patients: https://www.fda.gov/media/414327/download    METHODOLOGY: Isothermal Nucleic Acid Amplification         Urine culture clean catch [[id number]]     Order Status: Canceled               RADIOLOGY  MRI brain without contrast   Final Result   1. No acute infarct or other acute intracranial process.   2. Wedge-shaped area of FLAIR signal abnormality and encephalomalacia within   the dorsal, medial left cerebellar hemisphere suggesting sequelae of remote   PICA territory infarct.         XR CHEST PORTABLE   Final Result   No acute process.               Discharge Medications     Medication List        CONTINUE taking these medications      Alcohol Pads 70 % Pads  1 each by Does not apply route at bedtime     aspirin 81 MG EC tablet  Take 1 tablet by mouth daily     atorvastatin 20 MG tablet  Commonly

## 2024-08-01 NOTE — PROGRESS NOTES
Inpatient Occupational Therapy Evaluation and Treatment    Unit: PCU  Date:  8/1/2024  Patient Name:    Manjinder Choi  Admitting diagnosis:  TIA (transient ischemic attack) [G45.9]  Right sided weakness [R53.1]  Fatigue, unspecified type [R53.83]  Admit Date:  7/31/2024  Precautions/Restrictions/WB Status/ Lines/ Wounds/ Oxygen: Fall risk, Lines (IV), and Telemetry    Pt seen for cotreatment this date due to patient safety, patient endurance, and acute illness/injury    Treatment Time:  795-042  Treatment Number:  1  Timed Code Treatment Minutes: 41 minutes  Total Treatment Minutes:  51  minutes    Patient Goals for Therapy: \"stop falling asleep suddenly\"          Discharge Recommendations: Home with PRN assist, consider Outpatient PT for high level balance deficits  DME needs for discharge: Shower Chair       Therapy recommendations for staff:   Independent for ambulation with use of No AD within room    History of Present Illness: per Dr May H&P 8/1/24:  \"Chief Complaint: TIA (transient ischemic attack)  Manjinder Choi is a 54 y.o. male who presents with generalised weakness.pt has been falling asleep easily fels weak does not feel like has energy getting out of bed.  Has been gaining some weight otherwise no acute complaints  Denied any fevers, chills, CP, SOB, cough, N/V, abdominal pain, C/D or urinary changes. Denied any tobacco or alcohol use smokes marijuana occasionally\"    AM-PAC Score: AM-PAC Inpatient Daily Activity Raw Score: 24     Subjective:  Patient sitting up in chair with no family present.   Pt agreeable to this OT session.     Cognition:    A&O Person, Place, Time, and Situation   Able to follow 2 step commands    Pain:   No  Location:   Rating: NA /10  Pain Medicine Status: No request made    Activity Tolerance:   Pt completed therapy session with no adverse symptoms    Pt Position BP (mmHg) HR (bpm) SpO2 (%) on RA  Comments   Seated in recliner at beginning of session 151/93 62 95%     Seated

## 2024-08-01 NOTE — PROGRESS NOTES
Patient admitted to room 308 from [facility]. Patient oriented to room, call light, bed rails, phone, lights and bathroom. Patient instructed about the schedule of the day including: vital sign frequency, lab draws, possible tests, frequency of MD and staff rounds, daily weights, I &O's and prescribed diet. Telemetry box in place, patient aware of placement and reason. Bed locked, in lowest position, side rails up 2/4, call light within reach.        Recliner Assessment  Patient is able to demonstrate the ability to move from a reclining position to an upright position within the recliner.       4 Eyes Skin Assessment     NAME:  Manjinder Choi  YOB: 1970  MEDICAL RECORD NUMBER:  [id number]    The patient is being assessed for  Admission    I agree that at least one RN has performed a thorough Head to Toe Skin Assessment on the patient. ALL assessment sites listed below have been assessed.      Areas assessed by both nurses:    Head, Face, Ears, Shoulders, Back, Chest, Arms, Elbows, Hands, Sacrum. Buttock, Coccyx, Ischium, and Legs. Feet and Heels        Does the Patient have a Wound? No noted wound(s)       Robles Prevention initiated by RN: No  Wound Care Orders initiated by RN: No    Pressure Injury (Stage 3,4, Unstageable, DTI, NWPT, and Complex wounds) if present, place Wound referral order by RN under : No    New Ostomies, if present place, Ostomy referral order under : No     Nurse 1 eSignature: Electronically signed by Coleen Marsh RN on 8/1/24 at 2:48 AM EDT    **SHARE this note so that the co-signing nurse can place an eSignature**    Nurse 2 eSignature: Electronically signed by Sandy Zambrano RN on 8/1/24 at 2:49 AM EDT

## 2024-08-01 NOTE — PROGRESS NOTES
Speech Language Pathology  Swallowing Disorders and Dysphagia  Clinical Bedside Swallow Assessment  [place] [facility] [place]    Instrumentation: Not clinically indicated at this time  Diet recommendation: IDDSI 7 Regular Solids; IDDSI 0 Thin Liquids; Meds whole with thin liquids  Risk management: upright for all intake, stay upright for at least 30 mins after intake, small bites/sips, general GERD precautions, general aspiration precautions, and hold PO and contact SLP if s/s of aspiration or worsening respiratory status develop.    Although current clinical presentation appears grossly WFL, POC ensures pt is followed 1-3x/wk given nature of potential neurological deficits and risk of acute change in status.       NAME:Manjinder Choi  : 1970 (54 y.o.)   MRN: [id number]  ROOM: [place] [id number]-  ADMISSION DATE: 2024  PATIENT DIAGNOSIS(ES): TIA (transient ischemic attack) [G45.9]  Right sided weakness [R53.1]  Fatigue, unspecified type [R53.83]  Chief Complaint   Patient presents with    Fatigue     Fatigue for 3 to 4 weeks.  Has seen PCP and was in the ER ([facility]) 2 days ago.  Has been thinking that it was sugar related.  Dexcom isn't always matching his FSBS readings at the same time.       Patient Active Problem List    Diagnosis Date Noted    TIA (transient ischemic attack) 2021    Type 2 diabetes mellitus, without long-term current use of insulin ([facility]) 2021    Coronary artery disease involving native coronary artery of native heart without angina pectoris 10/24/2017    Mixed hyperlipidemia 10/24/2017    Essential (primary) hypertension 10/24/2017     Past Medical History:   Diagnosis Date    Coronary artery disease involving native heart without angina pectoris     Gastro-esophageal reflux disease without esophagitis 10/24/2017    Mixed hyperglyceridemia     Nonrheumatic tricuspid valve regurgitation     Sleep apnea     TIA (transient ischemic attack)     2019 &     Tobacco  Patient reports that he presents due to severe fatigue to the point where he is falling asleep during the day as well as further decrease in sensation on the right side of his face arm and leg from his baseline.  Patient reports all symptoms have been ongoing for approximately 4 weeks.  Patient reports 2 days ago he went to [facility] emergency department and had a workup which did show elevated blood sugars and CTs of the head and neck which showed an area of encephalomalacia in the left inferior cerebellar hemisphere.  It was advised that the patient call his neurologist as an outpatient but patient has not yet attempted to call or make an appoint with his neurologist.  Patient reports symptoms have continued to worsen over the past 2 days and therefore he came to the emergency department for reevaluation.  Patient denies any fevers cough runny nose nausea vomiting sore throat or infectious symptoms.  Patient denies any chest pain.    Patient Complaints:   None reported during pt interview    Baseline Method of Oral Meds: Whole with liquid     Admitting diagnoses and active problems as follows: Principal Problem:    TIA (transient ischemic attack)  Active Problems:    Type 2 diabetes mellitus, without long-term current use of insulin (HCC)    Coronary artery disease involving native coronary artery of native heart without angina pectoris    Mixed hyperlipidemia    Essential (primary) hypertension  Resolved Problems:    * No resolved hospital problems. *      Additional Pertinent Information and Pt-Reported Symptoms/Complaints:     Pt states that he is hungry and thirsty, and his voice is hoarse because his mouth is dry.    Predisposing dysphagia risk factors: GERD and Chronic Smoking History  Clinical signs of possible chronic dysphagia: N/A  Precipitating dysphagia risk factors: N/A    Code Status as listed in chart:  Full Code      Cranial nerve exam:   CN V (trigeminal): ophthalmic,  compensatory strategies developed based upon clinical exam.       Pt Education: SLP educated the patient re: Role of SLP, rationale for completion of assessment, results of assessment, recommendations, and POC  Pt Education Response: verbalized understanding    Duration/Frequency of Tx: 1-3 total follow-ups    Individuals Consulted:   Patient       Safety Devices / Report:   All fall risk precautions in place  call light in reach  Left in chair                          Total Treatment Time / Charges       Time in Time out Total Time / units   Swallow Eval/Tx Time  1012 1036 24 mins/ 2 units     Signature:    Lily Mccauley  Graduate Speech Therapy Student

## 2024-08-02 ENCOUNTER — TELEPHONE (OUTPATIENT)
Dept: FAMILY MEDICINE CLINIC | Age: 54
End: 2024-08-02

## 2024-08-02 PROBLEM — R53.83 FATIGUE: Status: ACTIVE | Noted: 2024-08-02

## 2024-08-02 NOTE — TELEPHONE ENCOUNTER
Care Transitions Initial Follow Up Call    Outreach made within 2 business days of discharge: Yes    Patient: Manjinder Choi Patient : 1970   MRN: [id number]  Reason for Admission: TIA  Discharge Date: 24       Spoke with: LEFT A VM     Discharge department/facility: [facility] Patient Contact:  Was patient able to fill all prescriptions: No: LEFT A VM  Was patient instructed to bring all medications to the follow-up visit: No: LEFT A VM  Is patient taking all medications as directed in the discharge summary? LEFT A VM  Does patient understand their discharge instructions: No: LEFT A VM  Does patient have questions or concerns that need addressed prior to 7-14 day follow up office visit: no LEFT A VM    Additional needs identified to be addressed with provider  LEFT A VM             Scheduled appointment with PCP within 7-14 days    Follow Up  No future appointments.    Nicolasa Krause MA

## 2024-08-08 ENCOUNTER — OFFICE VISIT (OUTPATIENT)
Dept: FAMILY MEDICINE CLINIC | Age: 54
End: 2024-08-08
Payer: COMMERCIAL

## 2024-08-08 VITALS
DIASTOLIC BLOOD PRESSURE: 86 MMHG | OXYGEN SATURATION: 93 % | HEIGHT: 68 IN | SYSTOLIC BLOOD PRESSURE: 138 MMHG | WEIGHT: 296.6 LBS | BODY MASS INDEX: 44.95 KG/M2

## 2024-08-08 DIAGNOSIS — N39.0 GROUP B STREPTOCOCCAL UTI: ICD-10-CM

## 2024-08-08 DIAGNOSIS — G47.33 OSA (OBSTRUCTIVE SLEEP APNEA): ICD-10-CM

## 2024-08-08 DIAGNOSIS — Z09 HOSPITAL DISCHARGE FOLLOW-UP: Primary | ICD-10-CM

## 2024-08-08 DIAGNOSIS — E11.65 UNCONTROLLED TYPE 2 DIABETES MELLITUS WITH HYPERGLYCEMIA (HCC): ICD-10-CM

## 2024-08-08 DIAGNOSIS — B95.1 GROUP B STREPTOCOCCAL UTI: ICD-10-CM

## 2024-08-08 DIAGNOSIS — R42 DIZZINESS: ICD-10-CM

## 2024-08-08 PROCEDURE — 1111F DSCHRG MED/CURRENT MED MERGE: CPT

## 2024-08-08 PROCEDURE — 99215 OFFICE O/P EST HI 40 MIN: CPT

## 2024-08-08 RX ORDER — TIRZEPATIDE 2.5 MG/.5ML
2.5 INJECTION, SOLUTION SUBCUTANEOUS WEEKLY
Qty: 4 EACH | Refills: 0 | Status: SHIPPED | OUTPATIENT
Start: 2024-08-08

## 2024-08-08 RX ORDER — AMOXICILLIN AND CLAVULANATE POTASSIUM 875; 125 MG/1; MG/1
1 TABLET, FILM COATED ORAL 2 TIMES DAILY
Qty: 20 TABLET | Refills: 0 | Status: SHIPPED | OUTPATIENT
Start: 2024-08-08 | End: 2024-08-18

## 2024-08-08 ASSESSMENT — ENCOUNTER SYMPTOMS
RESPIRATORY NEGATIVE: 1
GASTROINTESTINAL NEGATIVE: 1

## 2024-08-08 NOTE — PROGRESS NOTES
Impression  EXAMINATION:  MRI OF THE BRAIN WITHOUT CONTRAST  8/1/2024 1:00 pm     TECHNIQUE:  Multiplanar multisequence MRI of the brain was performed without the  administration of intravenous contrast.     COMPARISON:  Not available at time of dictation     HISTORY:  ORDERING SYSTEM PROVIDED HISTORY: TIA        FINDINGS:  INTRACRANIAL STRUCTURES/VENTRICLES: No evidence of an acute infarct or other  acute parenchymal process. No evidence of acute intracranial hemorrhage.  There is no evidence of an intracranial mass or extraaxial fluid collection.  No significant mass effect or midline shift. 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  There is no significant volume loss. The ventricles are within normal limits  of size and configuration for age.  The sellar/suprasellar regions appear  unremarkable. The normal signal voids within the major intracranial vessels  appear maintained.     ORBITS: The visualized portion of the orbits demonstrate no acute abnormality.     SINUSES: The visualized paranasal sinuses and mastoid air cells are well  aerated.     BONES/SOFT TISSUES: The bone marrow signal intensity appears normal. The soft  tissues demonstrate no acute abnormality.     IMPRESSION:  1. No acute infarct or other acute intracranial process.  2. Wedge-shaped area of FLAIR signal abnormality and encephalomalacia within  the dorsal, medial left cerebellar hemisphere suggesting sequelae of remote  PICA territory infarct.     Urine culture on 8/1/2024 was positive for beta-hemolytic Streptococcus group B.  [place] count greater than 100,000.  Penicillin and ampicillin are the drugs of choice for treatment of findings.    Lab Results   Component Value Date/Time     08/01/2024 07:30 AM    K 4.1 08/01/2024 07:30 AM    CL 99 08/01/2024 07:30 AM    CO2 24 08/01/2024 07:30 AM    BUN 16

## 2024-08-13 DIAGNOSIS — G47.33 OSA (OBSTRUCTIVE SLEEP APNEA): Primary | ICD-10-CM

## 2024-08-16 ENCOUNTER — OFFICE VISIT (OUTPATIENT)
Dept: PULMONOLOGY | Age: 54
End: 2024-08-16
Payer: COMMERCIAL

## 2024-08-16 VITALS
HEIGHT: 68 IN | HEART RATE: 72 BPM | OXYGEN SATURATION: 95 % | DIASTOLIC BLOOD PRESSURE: 84 MMHG | BODY MASS INDEX: 45.07 KG/M2 | SYSTOLIC BLOOD PRESSURE: 136 MMHG | WEIGHT: 297.4 LBS

## 2024-08-16 DIAGNOSIS — R06.81 WITNESSED EPISODE OF APNEA: ICD-10-CM

## 2024-08-16 DIAGNOSIS — R06.83 SNORING: ICD-10-CM

## 2024-08-16 DIAGNOSIS — I25.10 CORONARY ARTERY DISEASE INVOLVING NATIVE CORONARY ARTERY OF NATIVE HEART WITHOUT ANGINA PECTORIS: ICD-10-CM

## 2024-08-16 DIAGNOSIS — G47.10 HYPERSOMNIA: Primary | ICD-10-CM

## 2024-08-16 DIAGNOSIS — I10 ESSENTIAL (PRIMARY) HYPERTENSION: ICD-10-CM

## 2024-08-16 PROCEDURE — 1036F TOBACCO NON-USER: CPT | Performed by: STUDENT IN AN ORGANIZED HEALTH CARE EDUCATION/TRAINING PROGRAM

## 2024-08-16 PROCEDURE — 3079F DIAST BP 80-89 MM HG: CPT | Performed by: STUDENT IN AN ORGANIZED HEALTH CARE EDUCATION/TRAINING PROGRAM

## 2024-08-16 PROCEDURE — 99204 OFFICE O/P NEW MOD 45 MIN: CPT | Performed by: STUDENT IN AN ORGANIZED HEALTH CARE EDUCATION/TRAINING PROGRAM

## 2024-08-16 PROCEDURE — 3017F COLORECTAL CA SCREEN DOC REV: CPT | Performed by: STUDENT IN AN ORGANIZED HEALTH CARE EDUCATION/TRAINING PROGRAM

## 2024-08-16 PROCEDURE — G8427 DOCREV CUR MEDS BY ELIG CLIN: HCPCS | Performed by: STUDENT IN AN ORGANIZED HEALTH CARE EDUCATION/TRAINING PROGRAM

## 2024-08-16 PROCEDURE — 1111F DSCHRG MED/CURRENT MED MERGE: CPT | Performed by: STUDENT IN AN ORGANIZED HEALTH CARE EDUCATION/TRAINING PROGRAM

## 2024-08-16 PROCEDURE — G8417 CALC BMI ABV UP PARAM F/U: HCPCS | Performed by: STUDENT IN AN ORGANIZED HEALTH CARE EDUCATION/TRAINING PROGRAM

## 2024-08-16 PROCEDURE — 3075F SYST BP GE 130 - 139MM HG: CPT | Performed by: STUDENT IN AN ORGANIZED HEALTH CARE EDUCATION/TRAINING PROGRAM

## 2024-08-16 ASSESSMENT — SLEEP AND FATIGUE QUESTIONNAIRES
HOW LIKELY ARE YOU TO NOD OFF OR FALL ASLEEP IN A CAR, WHILE STOPPED FOR A FEW MINUTES IN TRAFFIC: HIGH CHANCE OF DOZING
HOW LIKELY ARE YOU TO NOD OFF OR FALL ASLEEP WHILE LYING DOWN TO REST IN THE AFTERNOON WHEN CIRCUMSTANCES PERMIT: HIGH CHANCE OF DOZING
HOW LIKELY ARE YOU TO NOD OFF OR FALL ASLEEP WHILE SITTING QUIETLY AFTER LUNCH WITHOUT ALCOHOL: HIGH CHANCE OF DOZING
HOW LIKELY ARE YOU TO NOD OFF OR FALL ASLEEP WHILE WATCHING TV: HIGH CHANCE OF DOZING
ESS TOTAL SCORE: 24
HOW LIKELY ARE YOU TO NOD OFF OR FALL ASLEEP WHILE SITTING AND TALKING TO SOMEONE: HIGH CHANCE OF DOZING
HOW LIKELY ARE YOU TO NOD OFF OR FALL ASLEEP WHILE SITTING AND READING: HIGH CHANCE OF DOZING
HOW LIKELY ARE YOU TO NOD OFF OR FALL ASLEEP WHEN YOU ARE A PASSENGER IN A CAR FOR AN HOUR WITHOUT A BREAK: HIGH CHANCE OF DOZING
HOW LIKELY ARE YOU TO NOD OFF OR FALL ASLEEP WHILE SITTING INACTIVE IN A PUBLIC PLACE: HIGH CHANCE OF DOZING

## 2024-08-16 NOTE — PATIENT INSTRUCTIONS
episodes during sleep when their throat closes or significantly narrows and they cannot inhale air into their lungs. This happens because the muscles that normally hold the throat open during wakefulness relax during sleep and allow it to narrow.    When the muscles relax too much, trying to inhale can cause the throat to completely close and air cannot pass at all for at least 10 seconds. This is an obstructive apnea. An obstructive hypopnea occurs when the throat is partially closed for at least 10 seconds and airflow is decreased so much that oxygen in the blood starts to be fall.        TOBIN is measured by how many apneas and hypopneas we have per hour.  This is called an Apnea Hypopnea Index (AHI).  It is considered excessive to have more than 5 apneas or hypopneas per hour as this can be extremely disruptive to sleep and have significant effects on our health and well being.        How does TOBIN affect me?  TOBIN goes beyond affecting just our quality of sleep. It interferes with many other systems of our body.    Increase in blood pressure  Worsened control of diabetes  Daytime sleepiness and fatigue  Irritability  Difficulty concentrating or remembering facts  Difficulty losing weight  Swelling in the legs  Frequent awakenings to urinate  Increased risk of stroke  Increased risk of irregular heart rhythm  Increased risk for cardiovascular disease  Decreased sexual drive  Morning headaches  Increased risk of motor vehicle accident    How is TOBIN treated?  The first line of treatment for TOBIN is continuous positive airway pressure (CPAP).  A CPAP device provides air though a mask that fits over your nose or mouth and nose.  The CPAP provides enough airflow to prevent the airway from collapsing when sleeping.  This air can be humidified for comfort. Newer masks fit comfortably over the nasal opening rather than over the nose. It is important that CPAP is used regularly each night for optimal results.  Patients should

## 2024-08-16 NOTE — PROGRESS NOTES
[facility]  Sleep Medicine  [facility], [address]  [place] [address]    Chief Complaint   Patient presents with    Snoring       Manjinder Choi is a 54 y.o. male who comes in for sleep evaluation.  His complaints include excessive daytime sleepiness and previously diagnosed TOBIN, low energy. Onset of symptoms was  several months . Patient was diagnosed with severe TOBIN several years ago but could not tolerate PAP therapy.     Pertinent PMHx includes: hypertension, anxiety, HLD, DM2, morbid obesity.    He goes to bed at 9-10pm. He falls asleep in  few minutes.  He awakens about 2 times per night (to urinate). He awakens at 4:30-5a). The average total amount of sleep is about 7 hours per night. He does not use sleep aid/s. He does take daytime naps. He describes the symptoms as daytime sleepiness, fatigue, snoring, witnessed apneas, waking up in the middle of the night gasping for air, non refreshed sleep , falling asleep/dosing off during idle situations, morning dry mouth, and strange dreams that sometimes he does not remember .  He does have symptoms that fulfill the criteria for restless legs syndrome. He has felt extremely sleepy while driving. He also reports recent significant weight gain. Previous evaluation and treatment has included: PSG and PAP therapy.    Caffeinated drinks/day: some iced tea  Alcohol intake/day/week: none  Occupation:  (not doing it now)      DATA REVIEWED TODAY:  [place] & Insomnia Severity Index scores      8/16/2024     9:24 AM   Sleep Medicine   Sitting and reading 3   Watching TV 3   Sitting, inactive in a public place (e.g. a theatre or a meeting) 3   As a passenger in a car for an hour without a break 3   Lying down to rest in the afternoon when circumstances permit 3   Sitting and talking to someone 3   Sitting quietly after a lunch without alcohol 3   In a car, while stopped for a few minutes in traffic 3   [place] Sleepiness Score 24   Neck

## 2024-08-19 NOTE — PROGRESS NOTES
Physician Progress Note      PATIENT:               SRINATH HEADLEY  CSN #:                  [id number]  :                       1970  ADMIT DATE:       2024 2:11 PM  DISCH DATE:        2024 6:55 PM  RESPONDING  PROVIDER #:        CHESTER KENT          QUERY TEXT:    Pt admitted with weakness and fatigue. Pt noted to have possible TIA. Hx of   CVA with MRI noting wedge-shaped area suggesting sequelae of remote PICA   territory infarct.  After study, please clarify the following:    The medical record reflects the following:  Risk Factors: age, cva, cad, DM, htn, hld, morbid obesity, TOBIN-noncompliant   with cpap  Clinical Indicators: Generalized weakness- could be multifactorial,  MRI brain   shows no acute infarct or other acute intracranial process, wedge-shaped area   suggesting sequelae of remote PICA territory infarct - continue aspirin and   statin, per neuro consult note on : Possible TIA, Patient non-compliant   with home CPAP. Fatigue and lethargy most likely secondary to TOBIN and   non-compliance  Treatment: MRI, neuro consult, asa, statin, PT, OT, darell, neuro consult    Thank you,  Sana David RN,BSN,CCDS,CRCR  Options provided:  -- Weakness suspected due to sequela of prior CVA  -- Weakness suspected due to TIA  -- Weakness suspected due to, please specify  -- Other - I will add my own diagnosis  -- Disagree - Not applicable / Not valid  -- Disagree - Clinically unable to determine / Unknown  -- Refer to Clinical Documentation Reviewer    PROVIDER RESPONSE TEXT:    Weakness suspected due to Combination of sequela of prior CVA and   noncompliance with home CPAP.    Query created by: Sana David on 2024 1:08 PM      Electronically signed by:  CHESTER KENT 2024 10:23 AM

## 2024-08-21 ENCOUNTER — HOSPITAL ENCOUNTER (INPATIENT)
Age: 54
LOS: 2 days | Discharge: HOME | End: 2024-08-23
Attending: EMERGENCY MEDICINE | Admitting: INTERNAL MEDICINE
Payer: COMMERCIAL

## 2024-08-21 ENCOUNTER — APPOINTMENT (OUTPATIENT)
Dept: CT IMAGING | Age: 54
End: 2024-08-21
Payer: COMMERCIAL

## 2024-08-21 DIAGNOSIS — E11.65 TYPE 2 DIABETES MELLITUS WITH HYPERGLYCEMIA, WITHOUT LONG-TERM CURRENT USE OF INSULIN (HCC): ICD-10-CM

## 2024-08-21 DIAGNOSIS — K92.2 LOWER GI BLEED: Primary | ICD-10-CM

## 2024-08-21 DIAGNOSIS — R73.9 HYPERGLYCEMIA: ICD-10-CM

## 2024-08-21 DIAGNOSIS — R53.83 OTHER FATIGUE: ICD-10-CM

## 2024-08-21 PROBLEM — I25.83 CORONARY ARTERY DISEASE DUE TO LIPID RICH PLAQUE: Status: ACTIVE | Noted: 2017-10-24

## 2024-08-21 PROBLEM — Z79.4 TYPE 2 DIABETES MELLITUS WITHOUT COMPLICATION, WITH LONG-TERM CURRENT USE OF INSULIN (HCC): Status: ACTIVE | Noted: 2021-09-29

## 2024-08-21 LAB
ALBUMIN SERPL-MCNC: 3.7 G/DL (ref 3.4–5)
ALBUMIN/GLOB SERPL: 0.9 {RATIO} (ref 1.1–2.2)
ALP SERPL-CCNC: 109 U/L (ref 40–129)
ALT SERPL-CCNC: 32 U/L (ref 10–40)
ANION GAP SERPL CALCULATED.3IONS-SCNC: 9 MMOL/L (ref 3–16)
AST SERPL-CCNC: 23 U/L (ref 15–37)
BASE EXCESS BLDV CALC-SCNC: 3.3 MMOL/L (ref -3–3)
BASOPHILS # BLD: 0.1 K/UL (ref 0–0.2)
BASOPHILS NFR BLD: 1.3 %
BILIRUB SERPL-MCNC: 0.3 MG/DL (ref 0–1)
BUN SERPL-MCNC: 14 MG/DL (ref 7–20)
CALCIUM SERPL-MCNC: 9.2 MG/DL (ref 8.3–10.6)
CHLORIDE SERPL-SCNC: 95 MMOL/L (ref 99–110)
CO2 BLDV-SCNC: 32 MMOL/L
CO2 SERPL-SCNC: 31 MMOL/L (ref 21–32)
COHGB MFR BLDV: 1.8 % (ref 0–1.5)
CREAT SERPL-MCNC: 0.6 MG/DL (ref 0.9–1.3)
DEPRECATED RDW RBC AUTO: 14.8 % (ref 12.4–15.4)
EKG ATRIAL RATE: 61 BPM
EKG DIAGNOSIS: NORMAL
EKG P AXIS: 55 DEGREES
EKG P-R INTERVAL: 194 MS
EKG Q-T INTERVAL: 432 MS
EKG QRS DURATION: 92 MS
EKG QTC CALCULATION (BAZETT): 434 MS
EKG R AXIS: 89 DEGREES
EKG T AXIS: 16 DEGREES
EKG VENTRICULAR RATE: 61 BPM
EOSINOPHIL # BLD: 0.2 K/UL (ref 0–0.6)
EOSINOPHIL NFR BLD: 1.6 %
GFR SERPLBLD CREATININE-BSD FMLA CKD-EPI: >90 ML/MIN/{1.73_M2}
GLUCOSE BLD-MCNC: 228 MG/DL (ref 70–99)
GLUCOSE BLD-MCNC: 247 MG/DL (ref 70–99)
GLUCOSE BLD-MCNC: 319 MG/DL (ref 70–99)
GLUCOSE BLD-MCNC: 322 MG/DL (ref 70–99)
GLUCOSE BLD-MCNC: 324 MG/DL (ref 70–99)
GLUCOSE SERPL-MCNC: 441 MG/DL (ref 70–99)
HCO3 BLDV-SCNC: 29.9 MMOL/L (ref 23–29)
HCT VFR BLD AUTO: 40.7 % (ref 40.5–52.5)
HCT VFR BLD AUTO: 44 % (ref 40.5–52.5)
HEMOCCULT STL QL: ABNORMAL
HGB BLD-MCNC: 13.2 G/DL (ref 13.5–17.5)
HGB BLD-MCNC: 14 G/DL (ref 13.5–17.5)
INR PPP: 0.93 (ref 0.85–1.15)
LYMPHOCYTES # BLD: 2.1 K/UL (ref 1–5.1)
LYMPHOCYTES NFR BLD: 21.7 %
MCH RBC QN AUTO: 27.1 PG (ref 26–34)
MCHC RBC AUTO-ENTMCNC: 31.8 G/DL (ref 31–36)
MCV RBC AUTO: 85 FL (ref 80–100)
METHGB MFR BLDV: 0.3 %
MONOCYTES # BLD: 0.7 K/UL (ref 0–1.3)
MONOCYTES NFR BLD: 6.9 %
NEUTROPHILS # BLD: 6.7 K/UL (ref 1.7–7.7)
NEUTROPHILS NFR BLD: 68.5 %
O2 CT VFR BLDV CALC: 15 VOL %
O2 THERAPY: ABNORMAL
PCO2 BLDV: 53.8 MMHG (ref 40–50)
PERFORMED ON: ABNORMAL
PH BLDV: 7.36 [PH] (ref 7.35–7.45)
PLATELET # BLD AUTO: 261 K/UL (ref 135–450)
PMV BLD AUTO: 8.2 FL (ref 5–10.5)
PO2 BLDV: 45.6 MMHG (ref 25–40)
POTASSIUM SERPL-SCNC: 4.6 MMOL/L (ref 3.5–5.1)
PROT SERPL-MCNC: 7.9 G/DL (ref 6.4–8.2)
PROTHROMBIN TIME: 12.7 SEC (ref 11.9–14.9)
RBC # BLD AUTO: 5.18 M/UL (ref 4.2–5.9)
SAO2 % BLDV: 79 %
SODIUM SERPL-SCNC: 135 MMOL/L (ref 136–145)
TSH SERPL DL<=0.005 MIU/L-ACNC: 2.05 UIU/ML (ref 0.27–4.2)
WBC # BLD AUTO: 9.7 K/UL (ref 4–11)

## 2024-08-21 PROCEDURE — 93010 ELECTROCARDIOGRAM REPORT: CPT | Performed by: INTERNAL MEDICINE

## 2024-08-21 PROCEDURE — 85018 HEMOGLOBIN: CPT

## 2024-08-21 PROCEDURE — 2580000003 HC RX 258: Performed by: EMERGENCY MEDICINE

## 2024-08-21 PROCEDURE — 6370000000 HC RX 637 (ALT 250 FOR IP): Performed by: EMERGENCY MEDICINE

## 2024-08-21 PROCEDURE — 82803 BLOOD GASES ANY COMBINATION: CPT

## 2024-08-21 PROCEDURE — 85014 HEMATOCRIT: CPT

## 2024-08-21 PROCEDURE — 82270 OCCULT BLOOD FECES: CPT

## 2024-08-21 PROCEDURE — 85025 COMPLETE CBC W/AUTO DIFF WBC: CPT

## 2024-08-21 PROCEDURE — 6360000004 HC RX CONTRAST MEDICATION

## 2024-08-21 PROCEDURE — 93005 ELECTROCARDIOGRAM TRACING: CPT

## 2024-08-21 PROCEDURE — 80053 COMPREHEN METABOLIC PANEL: CPT

## 2024-08-21 PROCEDURE — 96374 THER/PROPH/DIAG INJ IV PUSH: CPT

## 2024-08-21 PROCEDURE — 85610 PROTHROMBIN TIME: CPT

## 2024-08-21 PROCEDURE — 1200000000 HC SEMI PRIVATE

## 2024-08-21 PROCEDURE — 99223 1ST HOSP IP/OBS HIGH 75: CPT

## 2024-08-21 PROCEDURE — 6370000000 HC RX 637 (ALT 250 FOR IP)

## 2024-08-21 PROCEDURE — 6360000002 HC RX W HCPCS

## 2024-08-21 PROCEDURE — 84443 ASSAY THYROID STIM HORMONE: CPT

## 2024-08-21 PROCEDURE — 36415 COLL VENOUS BLD VENIPUNCTURE: CPT

## 2024-08-21 PROCEDURE — 83036 HEMOGLOBIN GLYCOSYLATED A1C: CPT

## 2024-08-21 PROCEDURE — 2580000003 HC RX 258

## 2024-08-21 PROCEDURE — 74174 CTA ABD&PLVS W/CONTRAST: CPT

## 2024-08-21 PROCEDURE — 99285 EMERGENCY DEPT VISIT HI MDM: CPT

## 2024-08-21 RX ORDER — ACETAMINOPHEN 325 MG/1
650 TABLET ORAL EVERY 6 HOURS PRN
Status: DISCONTINUED | OUTPATIENT
Start: 2024-08-21 | End: 2024-08-23 | Stop reason: HOSPADM

## 2024-08-21 RX ORDER — GLUCAGON 1 MG/ML
1 KIT INJECTION PRN
Status: DISCONTINUED | OUTPATIENT
Start: 2024-08-21 | End: 2024-08-23 | Stop reason: HOSPADM

## 2024-08-21 RX ORDER — ATORVASTATIN CALCIUM 10 MG/1
20 TABLET, FILM COATED ORAL DAILY
Status: DISCONTINUED | OUTPATIENT
Start: 2024-08-21 | End: 2024-08-23 | Stop reason: HOSPADM

## 2024-08-21 RX ORDER — GLIPIZIDE 5 MG/1
10 TABLET ORAL
Status: DISCONTINUED | OUTPATIENT
Start: 2024-08-22 | End: 2024-08-23 | Stop reason: HOSPADM

## 2024-08-21 RX ORDER — INSULIN GLARGINE 100 [IU]/ML
40 INJECTION, SOLUTION SUBCUTANEOUS NIGHTLY
Status: DISCONTINUED | OUTPATIENT
Start: 2024-08-21 | End: 2024-08-23 | Stop reason: HOSPADM

## 2024-08-21 RX ORDER — SODIUM CHLORIDE 9 MG/ML
INJECTION, SOLUTION INTRAVENOUS PRN
Status: DISCONTINUED | OUTPATIENT
Start: 2024-08-21 | End: 2024-08-23 | Stop reason: HOSPADM

## 2024-08-21 RX ORDER — SODIUM CHLORIDE 9 MG/ML
INJECTION, SOLUTION INTRAVENOUS CONTINUOUS
Status: DISCONTINUED | OUTPATIENT
Start: 2024-08-21 | End: 2024-08-22

## 2024-08-21 RX ORDER — LISINOPRIL 20 MG/1
20 TABLET ORAL DAILY
Status: DISCONTINUED | OUTPATIENT
Start: 2024-08-21 | End: 2024-08-23 | Stop reason: HOSPADM

## 2024-08-21 RX ORDER — ONDANSETRON 4 MG/1
4 TABLET, ORALLY DISINTEGRATING ORAL EVERY 8 HOURS PRN
Status: DISCONTINUED | OUTPATIENT
Start: 2024-08-21 | End: 2024-08-23 | Stop reason: HOSPADM

## 2024-08-21 RX ORDER — 0.9 % SODIUM CHLORIDE 0.9 %
1000 INTRAVENOUS SOLUTION INTRAVENOUS ONCE
Status: COMPLETED | OUTPATIENT
Start: 2024-08-21 | End: 2024-08-21

## 2024-08-21 RX ORDER — ONDANSETRON 2 MG/ML
4 INJECTION INTRAMUSCULAR; INTRAVENOUS EVERY 6 HOURS PRN
Status: DISCONTINUED | OUTPATIENT
Start: 2024-08-21 | End: 2024-08-23 | Stop reason: HOSPADM

## 2024-08-21 RX ORDER — IOPAMIDOL 755 MG/ML
100 INJECTION, SOLUTION INTRAVASCULAR
Status: COMPLETED | OUTPATIENT
Start: 2024-08-21 | End: 2024-08-21

## 2024-08-21 RX ORDER — ACETAMINOPHEN 650 MG/1
650 SUPPOSITORY RECTAL EVERY 6 HOURS PRN
Status: DISCONTINUED | OUTPATIENT
Start: 2024-08-21 | End: 2024-08-23 | Stop reason: HOSPADM

## 2024-08-21 RX ORDER — SODIUM CHLORIDE 0.9 % (FLUSH) 0.9 %
5-40 SYRINGE (ML) INJECTION EVERY 12 HOURS SCHEDULED
Status: DISCONTINUED | OUTPATIENT
Start: 2024-08-21 | End: 2024-08-23 | Stop reason: HOSPADM

## 2024-08-21 RX ORDER — DEXTROSE MONOHYDRATE 100 MG/ML
INJECTION, SOLUTION INTRAVENOUS CONTINUOUS PRN
Status: DISCONTINUED | OUTPATIENT
Start: 2024-08-21 | End: 2024-08-23 | Stop reason: HOSPADM

## 2024-08-21 RX ORDER — ESCITALOPRAM OXALATE 10 MG/1
10 TABLET ORAL DAILY
Status: DISCONTINUED | OUTPATIENT
Start: 2024-08-21 | End: 2024-08-23 | Stop reason: HOSPADM

## 2024-08-21 RX ORDER — SODIUM CHLORIDE 0.9 % (FLUSH) 0.9 %
5-40 SYRINGE (ML) INJECTION PRN
Status: DISCONTINUED | OUTPATIENT
Start: 2024-08-21 | End: 2024-08-23 | Stop reason: HOSPADM

## 2024-08-21 RX ORDER — HYDROCHLOROTHIAZIDE 25 MG/1
12.5 TABLET ORAL EVERY MORNING
Status: DISCONTINUED | OUTPATIENT
Start: 2024-08-22 | End: 2024-08-23 | Stop reason: HOSPADM

## 2024-08-21 RX ORDER — INSULIN LISPRO 100 [IU]/ML
0-8 INJECTION, SOLUTION INTRAVENOUS; SUBCUTANEOUS
Status: DISCONTINUED | OUTPATIENT
Start: 2024-08-21 | End: 2024-08-22

## 2024-08-21 RX ADMIN — PANTOPRAZOLE SODIUM 40 MG: 40 INJECTION, POWDER, FOR SOLUTION INTRAVENOUS at 18:43

## 2024-08-21 RX ADMIN — Medication 10 ML: at 22:04

## 2024-08-21 RX ADMIN — INSULIN LISPRO 6 UNITS: 100 INJECTION, SOLUTION INTRAVENOUS; SUBCUTANEOUS at 23:57

## 2024-08-21 RX ADMIN — INSULIN LISPRO 2 UNITS: 100 INJECTION, SOLUTION INTRAVENOUS; SUBCUTANEOUS at 18:43

## 2024-08-21 RX ADMIN — ATORVASTATIN CALCIUM 20 MG: 10 TABLET, FILM COATED ORAL at 18:43

## 2024-08-21 RX ADMIN — ESCITALOPRAM OXALATE 10 MG: 10 TABLET ORAL at 18:43

## 2024-08-21 RX ADMIN — INSULIN HUMAN 10 UNITS: 100 INJECTION, SOLUTION PARENTERAL at 15:16

## 2024-08-21 RX ADMIN — INSULIN GLARGINE 40 UNITS: 100 INJECTION, SOLUTION SUBCUTANEOUS at 22:03

## 2024-08-21 RX ADMIN — SODIUM CHLORIDE 1000 ML: 9 INJECTION, SOLUTION INTRAVENOUS at 15:19

## 2024-08-21 RX ADMIN — LISINOPRIL 20 MG: 20 TABLET ORAL at 18:44

## 2024-08-21 RX ADMIN — SODIUM CHLORIDE: 9 INJECTION, SOLUTION INTRAVENOUS at 21:39

## 2024-08-21 RX ADMIN — IOPAMIDOL 100 ML: 755 INJECTION, SOLUTION INTRAVENOUS at 16:46

## 2024-08-21 ASSESSMENT — ENCOUNTER SYMPTOMS
VOICE CHANGE: 0
PHOTOPHOBIA: 0
SHORTNESS OF BREATH: 0
ABDOMINAL PAIN: 0
TROUBLE SWALLOWING: 0
COLOR CHANGE: 0
WHEEZING: 0
ANAL BLEEDING: 1
VOMITING: 0
BACK PAIN: 0
STRIDOR: 0
FACIAL SWELLING: 0
NAUSEA: 0

## 2024-08-21 ASSESSMENT — PAIN SCALES - GENERAL
PAINLEVEL_OUTOF10: 0
PAINLEVEL_OUTOF10: 0

## 2024-08-21 ASSESSMENT — PAIN - FUNCTIONAL ASSESSMENT: PAIN_FUNCTIONAL_ASSESSMENT: NONE - DENIES PAIN

## 2024-08-21 NOTE — PROGRESS NOTES
This is a patient of [facility].  I asked nursing to redirect the consult to them.

## 2024-08-21 NOTE — ED NOTES
Bedside report given to Natalya transfer of care at this time and will add transport to [facility].    Pt to get CT prior to transfer upstairs.

## 2024-08-21 NOTE — PROGRESS NOTES
4 Eyes Skin Assessment     NAME:  Manjinder Choi  YOB: 1970  MEDICAL RECORD NUMBER:  [id number]    The patient is being assessed for  Admission    I agree that at least one RN has performed a thorough Head to Toe Skin Assessment on the patient. ALL assessment sites listed below have been assessed.      Areas assessed by both nurses:    Head, Face, Ears and Shoulders, Back, Chest        Does the Patient have a Wound? No noted wound(s)       Robles Prevention initiated by RN: No  Wound Care Orders initiated by RN: No    Pressure Injury (Stage 3,4, Unstageable, DTI, NWPT, and Complex wounds) if present, place Wound referral order by RN under : No    New Ostomies, if present place, Ostomy referral order under : No     Nurse 1 eSignature: Electronically signed by Natalya Morfin RN on 8/21/2024 at 6:56 PM     **SHARE this note so that the co-signing nurse can place an eSignature**    Nurse 2 eSignature: Electronically signed by Bridgette Mendoza RN on 8/21/24 at 8:25 PM EDT

## 2024-08-21 NOTE — H&P
Hospital Medicine History & Physical      PCP: Sal Esqueda, APRN - CNP    Date of Admission: 8/21/2024    Date of Service: Pt seen/examined on 8/21/2024     Chief Complaint:    Chief Complaint   Patient presents with    Rectal Bleeding     Patient complains of bright red rectal bleeding that began this AM, also feels very fatigued         History Of Present Illness:      The patient is a 54 y.o. male with pmhx of CVA, CAD, DM, HTN, HLD, anxiety, depression who presented to [facility] ED with complaint of rectal bleeding. Patient reports this has been ongoing issue for several months, it will wax and wane. Thought it was just due to hemorrhoids, but he will get in the shower and blood will \"poor from his rectum\". It has gotten so bad he wears pads daily 2/2 to symptoms. Today he bled through his pad, shorts and even onto the truck. No abdominal pain, vomiting, diarrhea. Takes baby asa but no other NSAIDs.   Did have colonoscopy done in the last year with just polyps.   Does also report feeling extremely fatigued all the time and easily falling asleep throughout the day. He is currently following with pulmonology and getting sleep study done but has been told he could also have narcolepsy.     Past Medical History:        Diagnosis Date    Coronary artery disease involving native heart without angina pectoris     Gastro-esophageal reflux disease without esophagitis 10/24/2017    Mixed hyperglyceridemia     Nonrheumatic tricuspid valve regurgitation     Sleep apnea     TIA (transient ischemic attack)     2019 & 2020    Tobacco use 10/24/2017       Past Surgical History:        Procedure Laterality Date    CARDIAC SURGERY      COLONOSCOPY      ROTATOR CUFF REPAIR         Medications Prior to Admission:    Prior to Admission medications    Medication Sig Start Date End Date Taking? Authorizing Provider   JARDIANCE 25 MG tablet Take 1 tablet by mouth daily 5/21/24  Yes Provider, MD Mira   linagliptin  anxiety or agitation.     Lab Results   Component Value Date    WBC 9.7 08/21/2024    HGB 14.0 08/21/2024    HCT 44.0 08/21/2024    MCV 85.0 08/21/2024     08/21/2024     Lab Results   Component Value Date     (L) 08/21/2024    K 4.6 08/21/2024    CL 95 (L) 08/21/2024    CO2 31 08/21/2024    BUN 14 08/21/2024    CREATININE 0.6 (L) 08/21/2024    GLUCOSE 441 (H) 08/21/2024    CALCIUM 9.2 08/21/2024    BILITOT 0.3 08/21/2024    ALKPHOS 109 08/21/2024    AST 23 08/21/2024    ALT 32 08/21/2024    LABGLOM >90 08/21/2024    GFRAA >60 05/12/2022    AGRATIO 0.9 (L) 08/21/2024    GLOB 3.6 (H) 08/29/2023           U/A:    Lab Results   Component Value Date/Time    COLORU Yellow 07/31/2024 02:30 PM    WBCUA 0-2 07/31/2024 02:30 PM    RBCUA 0-2 07/31/2024 02:30 PM    BACTERIA Rare 07/31/2024 02:30 PM    CLARITYU Clear 07/31/2024 02:30 PM    LEUKOCYTESUR TRACE 07/31/2024 02:30 PM    BLOODU Negative 07/31/2024 02:30 PM    GLUCOSEU 250 07/31/2024 02:30 PM       CULTURES  Results       ** No results found for the last 336 hours. **              EKG:    Encounter Date: 08/21/24   EKG 12 Lead   Result Value    Ventricular Rate 61    Atrial Rate 61    P-R Interval 194    QRS Duration 92    Q-T Interval 432    QTc Calculation (Bazett) 434    P Axis 55    R Axis 89    T Axis 16    Diagnosis      Normal sinus rhythmLow voltage QRSCannot rule out Anteroseptal infarct (cited on or before 31-JUL-2024)Abnormal ECGWhen compared with ECG of 31-JUL-2024 15:59,Questionable change in QRS axis         RADIOLOGY  CTA ABDOMEN PELVIS W WO CONTRAST    (Results Pending)         Pertinent previous results reviewed   Echocardiogram from 2021  Summary:   The left ventricular wall motion is normal.   Overall left ventricular ejection fraction is estimated to be 60-65%.   Right ventricular systolic pressure is indeterminate due to poorly visualized   tricuspid regurgitation.   No significant abnormalities noted.       ASSESSMENT/PLAN:  Rectal

## 2024-08-21 NOTE — PROGRESS NOTES
Pt to unit from ED. VSS. Pt oriented to room. Pt noted to ambulate independently w/out issue. Pt instructed to call for help if needed and verbalized an understanding. Will continue to monitor.

## 2024-08-21 NOTE — ED PROVIDER NOTES
[facility] ED  eMERGENCY dEPARTMENT eNCOUnter      Pt Name: Manjinder Choi  MRN: [id number]  Birthdate 1970  Date of evaluation: 8/21/2024  Provider: Adama Perdue MD    CHIEF COMPLAINT       Chief Complaint   Patient presents with    Rectal Bleeding     Patient complains of bright red rectal bleeding that began this AM, also feels very fatigued         HISTORY OF PRESENT ILLNESS   (Location/Symptom, Timing/Onset, Context/Setting, Quality, Duration, Modifying Factors, Severity)  Note limiting factors.     History obtained from: the patient    Manjinder Choi is a 54 y.o. male with recent admission earlier this month for fatigue which was thought to be multifactorial in nature who presents due to continued fatigue but also reports bright red blood per rectum starting this morning.  Patient denies any trauma.  Patient is on 81 mg of aspirin but denies any other antiplatelet agents or anticoagulants or history of severe bleeds.      HPI    Nursing Notes were reviewed.    REVIEW OFSYSTEMS    (2-9 systems for level 4, 10 or more for level 5)     Review of Systems   Constitutional:  Positive for fatigue. Negative for appetite change, fever and unexpected weight change.   HENT:  Negative for facial swelling, trouble swallowing and voice change.    Eyes:  Negative for photophobia and visual disturbance.   Respiratory:  Negative for shortness of breath, wheezing and stridor.    Cardiovascular:  Negative for chest pain and palpitations.   Gastrointestinal:  Positive for anal bleeding. Negative for abdominal pain, nausea and vomiting.   Genitourinary:  Negative for difficulty urinating and dysuria.   Musculoskeletal:  Negative for back pain, gait problem and neck pain.   Skin:  Negative for color change and wound.   Neurological:  Negative for seizures, syncope and speech difficulty.   Psychiatric/Behavioral:  Negative for self-injury and suicidal ideas.        Except as noted above the remainder of the  normal limits with no signs of hypovolemic shock or severe anemia requiring transfusion.  Patient does have hyperglycemia at 441 but no evidence of DKA or other severe electrolyte abnormality.  Patient is given IV fluids and insulin with improvement in glucose to 247.  Given GI bleed GI is consulted and patient is admitted for further evaluation and care.  Patient expresses understanding and agreement with plan.      FINAL IMPRESSION      1. Lower GI bleed    2. Hyperglycemia    3. Other fatigue          DISPOSITION/PLAN     DISPOSITION Admitted 08/21/2024 04:13:35 PM  Condition at Disposition: Data Unavailable        (Please note that portions of this note were completed with a voice recognition program.  Efforts were made to edit the dictations but occasionally words are mis-transcribed.)    Adama Perdue MD (electronically signed)  Attending Emergency Physician           Adama Perdue MD  08/21/24 0079

## 2024-08-21 NOTE — PROGRESS NOTES
Called the consult for [facility] and canceled a consult  for gastro health that was put in from the e r  department johanny

## 2024-08-21 NOTE — PROGRESS NOTES
Consult has been called to Dr. hanna on 8/21/24. Spoke with chaparro. 6:04 PM    Leslye Chapa  8/21/2024

## 2024-08-22 LAB
ANION GAP SERPL CALCULATED.3IONS-SCNC: 10 MMOL/L (ref 3–16)
BASOPHILS # BLD: 0.1 K/UL (ref 0–0.2)
BASOPHILS NFR BLD: 0.7 %
BUN SERPL-MCNC: 13 MG/DL (ref 7–20)
CALCIUM SERPL-MCNC: 8.4 MG/DL (ref 8.3–10.6)
CHLORIDE SERPL-SCNC: 96 MMOL/L (ref 99–110)
CO2 SERPL-SCNC: 27 MMOL/L (ref 21–32)
CREAT SERPL-MCNC: 0.6 MG/DL (ref 0.9–1.3)
DEPRECATED RDW RBC AUTO: 14.7 % (ref 12.4–15.4)
EOSINOPHIL # BLD: 0.1 K/UL (ref 0–0.6)
EOSINOPHIL NFR BLD: 1.6 %
EST. AVERAGE GLUCOSE BLD GHB EST-MCNC: 274.7 MG/DL
GFR SERPLBLD CREATININE-BSD FMLA CKD-EPI: >90 ML/MIN/{1.73_M2}
GLUCOSE BLD-MCNC: 159 MG/DL (ref 70–99)
GLUCOSE BLD-MCNC: 207 MG/DL (ref 70–99)
GLUCOSE BLD-MCNC: 218 MG/DL (ref 70–99)
GLUCOSE BLD-MCNC: 227 MG/DL (ref 70–99)
GLUCOSE BLD-MCNC: 243 MG/DL (ref 70–99)
GLUCOSE SERPL-MCNC: 284 MG/DL (ref 70–99)
HBA1C MFR BLD: 11.2 %
HCT VFR BLD AUTO: 41.5 % (ref 40.5–52.5)
HCT VFR BLD AUTO: 41.9 % (ref 40.5–52.5)
HGB BLD-MCNC: 13.4 G/DL (ref 13.5–17.5)
HGB BLD-MCNC: 13.5 G/DL (ref 13.5–17.5)
IRON SATN MFR SERPL: 5 % (ref 20–50)
IRON SERPL-MCNC: 19 UG/DL (ref 59–158)
LYMPHOCYTES # BLD: 1.9 K/UL (ref 1–5.1)
LYMPHOCYTES NFR BLD: 21.2 %
MCH RBC QN AUTO: 27.2 PG (ref 26–34)
MCHC RBC AUTO-ENTMCNC: 32.3 G/DL (ref 31–36)
MCV RBC AUTO: 84.1 FL (ref 80–100)
MONOCYTES # BLD: 0.6 K/UL (ref 0–1.3)
MONOCYTES NFR BLD: 7.1 %
NEUTROPHILS # BLD: 6.3 K/UL (ref 1.7–7.7)
NEUTROPHILS NFR BLD: 69.4 %
PERFORMED ON: ABNORMAL
PLATELET # BLD AUTO: 261 K/UL (ref 135–450)
PMV BLD AUTO: 8 FL (ref 5–10.5)
POTASSIUM SERPL-SCNC: 4.1 MMOL/L (ref 3.5–5.1)
RBC # BLD AUTO: 4.94 M/UL (ref 4.2–5.9)
SODIUM SERPL-SCNC: 133 MMOL/L (ref 136–145)
TIBC SERPL-MCNC: 351 UG/DL (ref 260–445)
WBC # BLD AUTO: 9.1 K/UL (ref 4–11)

## 2024-08-22 PROCEDURE — 85014 HEMATOCRIT: CPT

## 2024-08-22 PROCEDURE — 83550 IRON BINDING TEST: CPT

## 2024-08-22 PROCEDURE — 2580000003 HC RX 258

## 2024-08-22 PROCEDURE — 36415 COLL VENOUS BLD VENIPUNCTURE: CPT

## 2024-08-22 PROCEDURE — 85018 HEMOGLOBIN: CPT

## 2024-08-22 PROCEDURE — 6360000002 HC RX W HCPCS

## 2024-08-22 PROCEDURE — 85025 COMPLETE CBC W/AUTO DIFF WBC: CPT

## 2024-08-22 PROCEDURE — 99232 SBSQ HOSP IP/OBS MODERATE 35: CPT | Performed by: INTERNAL MEDICINE

## 2024-08-22 PROCEDURE — 80048 BASIC METABOLIC PNL TOTAL CA: CPT

## 2024-08-22 PROCEDURE — 83540 ASSAY OF IRON: CPT

## 2024-08-22 PROCEDURE — 1200000000 HC SEMI PRIVATE

## 2024-08-22 PROCEDURE — 6370000000 HC RX 637 (ALT 250 FOR IP): Performed by: INTERNAL MEDICINE

## 2024-08-22 PROCEDURE — 6370000000 HC RX 637 (ALT 250 FOR IP)

## 2024-08-22 RX ORDER — LACTOBACILLUS RHAMNOSUS GG 10B CELL
1 CAPSULE ORAL
Status: DISCONTINUED | OUTPATIENT
Start: 2024-08-23 | End: 2024-08-23 | Stop reason: HOSPADM

## 2024-08-22 RX ORDER — INSULIN LISPRO 100 [IU]/ML
0-8 INJECTION, SOLUTION INTRAVENOUS; SUBCUTANEOUS
Status: DISCONTINUED | OUTPATIENT
Start: 2024-08-22 | End: 2024-08-23 | Stop reason: HOSPADM

## 2024-08-22 RX ADMIN — INSULIN LISPRO 2 UNITS: 100 INJECTION, SOLUTION INTRAVENOUS; SUBCUTANEOUS at 21:15

## 2024-08-22 RX ADMIN — PSYLLIUM HUSK 1 PACKET: 3.4 POWDER ORAL at 14:18

## 2024-08-22 RX ADMIN — GLIPIZIDE 10 MG: 5 TABLET ORAL at 08:13

## 2024-08-22 RX ADMIN — ESCITALOPRAM OXALATE 10 MG: 10 TABLET ORAL at 08:13

## 2024-08-22 RX ADMIN — INSULIN GLARGINE 40 UNITS: 100 INJECTION, SOLUTION SUBCUTANEOUS at 21:15

## 2024-08-22 RX ADMIN — ATORVASTATIN CALCIUM 20 MG: 10 TABLET, FILM COATED ORAL at 08:13

## 2024-08-22 RX ADMIN — PANTOPRAZOLE SODIUM 40 MG: 40 INJECTION, POWDER, FOR SOLUTION INTRAVENOUS at 21:15

## 2024-08-22 RX ADMIN — INSULIN LISPRO 2 UNITS: 100 INJECTION, SOLUTION INTRAVENOUS; SUBCUTANEOUS at 08:13

## 2024-08-22 RX ADMIN — HYDROCHLOROTHIAZIDE 12.5 MG: 25 TABLET ORAL at 08:12

## 2024-08-22 RX ADMIN — INSULIN LISPRO 2 UNITS: 100 INJECTION, SOLUTION INTRAVENOUS; SUBCUTANEOUS at 12:57

## 2024-08-22 RX ADMIN — INSULIN LISPRO 2 UNITS: 100 INJECTION, SOLUTION INTRAVENOUS; SUBCUTANEOUS at 04:24

## 2024-08-22 RX ADMIN — GLIPIZIDE 10 MG: 5 TABLET ORAL at 14:18

## 2024-08-22 RX ADMIN — LISINOPRIL 20 MG: 20 TABLET ORAL at 08:13

## 2024-08-22 RX ADMIN — PANTOPRAZOLE SODIUM 40 MG: 40 INJECTION, POWDER, FOR SOLUTION INTRAVENOUS at 08:13

## 2024-08-22 ASSESSMENT — PAIN SCALES - GENERAL: PAINLEVEL_OUTOF10: 0

## 2024-08-22 NOTE — CONSULTS
Gastroenterology Consult Note    Patient:   Manjinder Choi   :    1970   Facility:   [facility]   Referring/PCP: Sal Esqueda, APRN - CNP  Date:     2024  Consultant:   Sonido Devi MD, MD      Chief Complaint   Patient presents with    Rectal Bleeding     Patient complains of bright red rectal bleeding that began this AM, also feels very fatigued        History of Present illness     54 year old male with history of DM, HTN, HLD, CAD s/p PCI, obesity, CVA, anxiety, and depression presented to ER with complaints of rectal bleeding. He developed a single episode of painless rectal bleeding. He denies any abdominal pain, diarrhea or constipation. He denies any recent change in meds or use of NSAIDs. He has recently had colonoscopy in  which showed benign colon polyps, diverticulosis and hemorrhoids. He also had EGD () which showed gastritis.    Past Medical History:   Diagnosis Date    Coronary artery disease involving native heart without angina pectoris     Gastro-esophageal reflux disease without esophagitis 10/24/2017    Mixed hyperglyceridemia     Nonrheumatic tricuspid valve regurgitation     Sleep apnea     TIA (transient ischemic attack)      &     Tobacco use 10/24/2017     Past Surgical History:   Procedure Laterality Date    CARDIAC SURGERY      COLONOSCOPY      ROTATOR CUFF REPAIR         Social:   Social History     Tobacco Use    Smoking status: Never    Smokeless tobacco: Former     Types: Snuff, Chew   Substance Use Topics    Alcohol use: Never     Family:   Family History   Problem Relation Age of Onset    Diabetes Mother     High Blood Pressure Mother     Heart Disease Mother     Heart Disease Father     COPD Father     High Blood Pressure Father     Diabetes Sister      No current facility-administered medications on file prior to encounter.     Current Outpatient Medications on File Prior to Encounter   Medication Sig Dispense Refill     JARDIANCE 25 MG tablet Take 1 tablet by mouth daily      linagliptin (TRADJENTA) 5 MG tablet Take 1 tablet by mouth daily 30 tablet 3    insulin glargine (LANTUS) 100 UNIT/ML injection vial Inject 28 Units into the skin nightly (Patient taking differently: Inject 85 Units into the skin nightly) 5 each 5    escitalopram (LEXAPRO) 10 MG tablet Take 1 tablet by mouth daily 90 tablet 3    aspirin 81 MG EC tablet Take 1 tablet by mouth daily 90 tablet 3    hydroCHLOROthiazide 12.5 MG capsule Take 1 capsule by mouth every morning 90 capsule 3    atorvastatin (LIPITOR) 20 MG tablet Take 1 tablet by mouth daily 90 tablet 1    lisinopril (PRINIVIL;ZESTRIL) 20 MG tablet Take 1 tablet by mouth daily 30 tablet 3    Tirzepatide (MOUNJARO) 2.5 MG/0.5ML SOPN SC injection Inject 0.5 mLs into the skin once a week 4 each 0    Continuous Glucose  (DEXCOM G7 ) JOHANA USE AS DIRECTED      B-D INS SYR ULTRAFINE 1CC/30G 30G X 1/2\" 1 ML MISC USE AS DIRECTED      Continuous Glucose Sensor (DEXCOM G7 SENSOR) MISC 1 each by Does not apply route every 14 days 9 each 5    Insulin Syringe-Needle U-100 30G X 1/2\" 0.5 ML MISC 1 each by Does not apply route daily 100 each 5    pantoprazole (PROTONIX) 40 MG tablet Take 1 tablet by mouth in the morning and at bedtime 180 tablet 3    Alcohol Swabs (ALCOHOL PADS) 70 % PADS 1 each by Does not apply route at bedtime 100 each 5    glipiZIDE (GLUCOTROL) 10 MG tablet Take 1 tablet by mouth 2 times daily (before meals) 60 tablet 3    ondansetron (ZOFRAN-ODT) 4 MG disintegrating tablet Take 1 tablet by mouth 3 times daily as needed for Nausea or Vomiting 21 tablet 0      Infusions:    sodium chloride      dextrose       PRN Medications: sodium chloride flush, sodium chloride, ondansetron **OR** ondansetron, acetaminophen **OR** acetaminophen, glucose, dextrose bolus **OR** dextrose bolus, glucagon (rDNA), dextrose  Allergies: No Known Allergies    ROS: Constitutional: negative for chills,

## 2024-08-22 NOTE — CARE COORDINATION
08/22/24 1056   Readmission Assessment   Number of Days since last admission? 8-30 days   Previous Disposition Home with Family   Who is being Interviewed Patient   What was the patient's/caregiver's perception as to why they think they needed to return back to the hospital? Other (Comment)  (GI bleed)   Did you visit your Primary Care Physician after you left the hospital, before you returned this time? Yes   Did you see a specialist, such as Cardiac, Pulmonary, Orthopedic Physician, etc. after you left the hospital? No   Who advised the patient to return to the hospital? Self-referral   Does the patient report anything that got in the way of taking their medications? No   In our efforts to provide the best possible care to you and others like you, can you think of anything that we could have done to help you after you left the hospital the first time, so that you might not have needed to return so soon? Other (Comment)  (nothing)

## 2024-08-22 NOTE — PROGRESS NOTES
Handoff report and transfer of care given at bedside to hema .  Patient in stable condition, denies needs/concerns at this time.  Call light within reach.

## 2024-08-22 NOTE — PROGRESS NOTES
Admit: 2024    Name:  Manjinder Choi  Room:  [facility]-  MRN:    [id number]    Daily Progress Note for 2024   Admitted with rectal bleeding   Interval History:   No rectal bleeding since last night   Scheduled Meds:   atorvastatin  20 mg Oral Daily    escitalopram  10 mg Oral Daily    glipiZIDE  10 mg Oral BID AC    hydroCHLOROthiazide  12.5 mg Oral QAM    insulin glargine  40 Units SubCUTAneous Nightly    lisinopril  20 mg Oral Daily    sodium chloride flush  5-40 mL IntraVENous 2 times per day    pantoprazole (PROTONIX) 40 mg in sodium chloride (PF) 0.9 % 10 mL injection  40 mg IntraVENous 2 times per day    insulin lispro  0-8 Units SubCUTAneous 6 times per day       Continuous Infusions:   sodium chloride      dextrose      sodium chloride 75 mL/hr at 24 2139       PRN Meds:  sodium chloride flush, sodium chloride, ondansetron **OR** ondansetron, acetaminophen **OR** acetaminophen, glucose, dextrose bolus **OR** dextrose bolus, glucagon (rDNA), dextrose                  Objective:     Temp  Av °F (36.7 °C)  Min: 97.6 °F (36.4 °C)  Max: 98.4 °F (36.9 °C)  Pulse  Av.9  Min: 56  Max: 74  BP  Min: 147/73  Max: 175/80  SpO2  Av.3 %  Min: 93 %  Max: 100 %  Patient Vitals for the past 4 hrs:   BP Temp Temp src Pulse Resp SpO2   24 0800 (!) 167/92 98 °F (36.7 °C) Oral 56 18 95 %         Intake/Output Summary (Last 24 hours) at 2024 0911  Last data filed at 2024 0434  Gross per 24 hour   Intake 694 ml   Output --   Net 694 ml       Physical Exam:  General:  Awake, alert and oriented. Appears to be not in any distress  Mucous Membranes:  Pink , anicteric  Neck: No JVD, no carotid bruit, no thyromegaly  Chest:  Clear to auscultation bilaterally, no added sounds  Cardiovascular:  RRR S1S2 heard, no murmurs or gallops  Abdomen:  Soft, undistended, non tender, no organomegaly, BS present  Extremities: No edema or cyanosis. Distal pulses well felt  Neurological : no focal

## 2024-08-22 NOTE — FLOWSHEET NOTE
08/22/24 0800   Vital Signs   Temp 98 °F (36.7 °C)   Temp Source Oral   Pulse 56   Heart Rate Source Monitor   Respirations 18   BP (!) 167/92   MAP (Calculated) 117   BP Location Right upper arm   BP Method Automatic   Patient Position Up in chair   Pain Assessment   Pain Assessment None - Denies Pain   Opioid-Induced Sedation   POSS Score 1   RASS   Tellez Agitation Sedation Scale (RASS) 0   Oxygen Therapy   SpO2 95 %   O2 Device None (Room air)     Requesting shower, states blood dripping from rectum but no BMs. Abrasion noted on right forearm.  Alert and oriented resting in recliner, skin w/d, respirs e/e unlabored, VSS, nurse assessment complete, meds given, call light and overbed table within easy reach, no other needs at this time, see flowsheet and MAR. Sia Crum RN ([place])

## 2024-08-22 NOTE — CARE COORDINATION
Case Management Assessment  Initial Evaluation    Date/Time of Evaluation: 8/22/2024 10:55 AM  Assessment Completed by: Brenda Whiting RN    If patient is discharged prior to next notation, then this note serves as note for discharge by case management.    Patient Name: Manjinder Choi                   YOB: 1970  Diagnosis: GI bleed [K92.2]  Lower GI bleed [K92.2]  Hyperglycemia [R73.9]  Other fatigue [R53.83]                   Date / Time: 8/21/2024 12:45 PM    Patient Admission Status: Inpatient   Readmission Risk (Low < 19, Mod (19-27), High > 27): Readmission Risk Score: 11.1    Current PCP: Sal Esqueda APRN - CNP  PCP verified by CM? Yes    Chart Reviewed: Yes      History Provided by: Patient  Patient Orientation: Alert and Oriented    Patient Cognition: Alert    Hospitalization in the last 30 days (Readmission):  Yes    If yes, Readmission Assessment in CM Navigator will be completed.    Advance Directives:      Code Status: Full Code   Patient's Primary Decision Maker is: Legal Next of Kin      Discharge Planning:    Patient lives with: Children Type of Home: House  Primary Care Giver: Self  Patient Support Systems include: Children, Family Members   Current Financial resources: Medicaid  Current community resources: None  Current services prior to admission: None            Current DME:              Type of Home Care services:  None    ADLS  Prior functional level: Independent in ADLs/IADLs  Current functional level: Independent in ADLs/IADLs    PT AM-PAC:   /24  OT AM-PAC:   /24    Family can provide assistance at DC: Yes  Would you like Case Management to discuss the discharge plan with any other family members/significant others, and if so, who? No  Plans to Return to Present Housing: Yes  Other Identified Issues/Barriers to RETURNING to current housing: none  Potential Assistance needed at discharge: N/A            Potential DME:    Patient expects to discharge to: House  Plan  for transportation at discharge:      Financial    Payor: [employer] / Plan: [employer] [place] MEDICAID / Product Type: *No Product type* /     Does insurance require precert for SNF: Yes    Potential assistance Purchasing Medications: No  Meds-to-Beds request: Yes      [facility] - [place], [place] - [address] [facility] [address] - P [phone] - F [phone] [address] [address] [address]  Phone: [phone] Fax: [phone]      Notes:    Factors facilitating achievement of predicted outcomes: Family support and Cooperative    Barriers to discharge: none    Additional Case Management Notes: Reviewed chart. Met with pt. Pt from home with daughters and plan return. Following for possible home care.    The Plan for Transition of Care is related to the following treatment goals of GI bleed [K92.2]  Lower GI bleed [K92.2]  Hyperglycemia [R73.9]  Other fatigue [R53.83]    IF APPLICABLE: The Patient and/or patient representative Manjinder and his family were provided with a choice of provider and agrees with the discharge plan. Freedom of choice list with basic dialogue that supports the patient's individualized plan of care/goals and shares the quality data associated with the providers was provided to: Patient   Patient Representative Name:       The Patient and/or Patient Representative Agree with the Discharge Plan? Yes    Brenda Whiting RN  Case Management Department

## 2024-08-23 VITALS
RESPIRATION RATE: 18 BRPM | TEMPERATURE: 97.5 F | BODY MASS INDEX: 45.13 KG/M2 | SYSTOLIC BLOOD PRESSURE: 119 MMHG | HEIGHT: 68 IN | OXYGEN SATURATION: 93 % | HEART RATE: 88 BPM | WEIGHT: 297.8 LBS | DIASTOLIC BLOOD PRESSURE: 65 MMHG

## 2024-08-23 LAB
GLUCOSE BLD-MCNC: 211 MG/DL (ref 70–99)
GLUCOSE BLD-MCNC: 220 MG/DL (ref 70–99)
GLUCOSE BLD-MCNC: 235 MG/DL (ref 70–99)
HCT VFR BLD AUTO: 42.7 % (ref 40.5–52.5)
HGB BLD-MCNC: 13.9 G/DL (ref 13.5–17.5)
PERFORMED ON: ABNORMAL

## 2024-08-23 PROCEDURE — 6360000002 HC RX W HCPCS

## 2024-08-23 PROCEDURE — 6370000000 HC RX 637 (ALT 250 FOR IP): Performed by: INTERNAL MEDICINE

## 2024-08-23 PROCEDURE — 2580000003 HC RX 258

## 2024-08-23 PROCEDURE — 85018 HEMOGLOBIN: CPT

## 2024-08-23 PROCEDURE — 99238 HOSP IP/OBS DSCHRG MGMT 30/<: CPT | Performed by: INTERNAL MEDICINE

## 2024-08-23 PROCEDURE — 85014 HEMATOCRIT: CPT

## 2024-08-23 PROCEDURE — 36415 COLL VENOUS BLD VENIPUNCTURE: CPT

## 2024-08-23 PROCEDURE — 6370000000 HC RX 637 (ALT 250 FOR IP)

## 2024-08-23 RX ORDER — INSULIN GLARGINE 100 [IU]/ML
85 INJECTION, SOLUTION SUBCUTANEOUS NIGHTLY
Qty: 1 ADJUSTABLE DOSE PRE-FILLED PEN SYRINGE | Refills: 0
Start: 2024-08-23

## 2024-08-23 RX ADMIN — ESCITALOPRAM OXALATE 10 MG: 10 TABLET ORAL at 09:43

## 2024-08-23 RX ADMIN — Medication 10 ML: at 09:48

## 2024-08-23 RX ADMIN — ATORVASTATIN CALCIUM 20 MG: 10 TABLET, FILM COATED ORAL at 09:41

## 2024-08-23 RX ADMIN — PSYLLIUM HUSK 1 PACKET: 3.4 POWDER ORAL at 09:46

## 2024-08-23 RX ADMIN — LISINOPRIL 20 MG: 20 TABLET ORAL at 09:42

## 2024-08-23 RX ADMIN — Medication 1 CAPSULE: at 09:42

## 2024-08-23 RX ADMIN — INSULIN LISPRO 2 UNITS: 100 INJECTION, SOLUTION INTRAVENOUS; SUBCUTANEOUS at 09:48

## 2024-08-23 RX ADMIN — PANTOPRAZOLE SODIUM 40 MG: 40 INJECTION, POWDER, FOR SOLUTION INTRAVENOUS at 09:43

## 2024-08-23 RX ADMIN — HYDROCHLOROTHIAZIDE 12.5 MG: 25 TABLET ORAL at 09:42

## 2024-08-23 RX ADMIN — INSULIN LISPRO 2 UNITS: 100 INJECTION, SOLUTION INTRAVENOUS; SUBCUTANEOUS at 12:11

## 2024-08-23 RX ADMIN — GLIPIZIDE 10 MG: 5 TABLET ORAL at 09:42

## 2024-08-23 ASSESSMENT — PAIN SCALES - GENERAL: PAINLEVEL_OUTOF10: 0

## 2024-08-23 NOTE — FLOWSHEET NOTE
08/22/24 2001   Vital Signs   Temp 98.2 °F (36.8 °C)   Temp Source Oral   Pulse 62   Heart Rate Source Monitor   Respirations 18   BP (!) 163/90   MAP (Calculated) 114   BP Location Right upper arm   Pain Assessment   Pain Assessment None - Denies Pain   Opioid-Induced Sedation   POSS Score 1   Oxygen Therapy   SpO2 94 %   O2 Device None (Room air)     Pt sitting up in chair, talking to family on phone. No needs voiced. Hallie Paul, RN

## 2024-08-23 NOTE — PLAN OF CARE
Problem: Discharge Planning  Goal: Discharge to home or other facility with appropriate resources  8/23/2024 1110 by Sherry Menard RN  Outcome: Adequate for Discharge  8/22/2024 2126 by Hallie Paul RN  Outcome: Progressing     Problem: Pain  Goal: Verbalizes/displays adequate comfort level or baseline comfort level  8/23/2024 1110 by Sherry Menard RN  Outcome: Adequate for Discharge  8/22/2024 2126 by Hallie Paul RN  Outcome: Progressing     Problem: Chronic Conditions and Co-morbidities  Goal: Patient's chronic conditions and co-morbidity symptoms are monitored and maintained or improved  8/23/2024 1110 by Sherry Menard RN  Outcome: Adequate for Discharge  8/22/2024 2126 by Hallie Paul RN  Outcome: Progressing     Problem: Gastrointestinal - Adult  Goal: Maintains or returns to baseline bowel function  8/23/2024 1110 by Sherry Menard RN  Outcome: Adequate for Discharge  8/22/2024 2126 by Hallie Paul RN  Outcome: Progressing  Goal: Maintains adequate nutritional intake  8/23/2024 1110 by Sherry Menard RN  Outcome: Adequate for Discharge  8/22/2024 2126 by Hallie Paul RN  Outcome: Progressing     Problem: Metabolic/Fluid and Electrolytes - Adult  Goal: Electrolytes maintained within normal limits  8/23/2024 1110 by Sherry Menard RN  Outcome: Adequate for Discharge  8/22/2024 2126 by Hallie Paul RN  Outcome: Progressing

## 2024-08-23 NOTE — CARE COORDINATION
CASE MANAGEMENT DISCHARGE SUMMARY      Discharge to: home with family    IMM given: NA Care Source      Transportation:      Family/car: yes      Confirmed discharge plan with:     Patient: yes     Family:  no - pt calling himself     RN name: Sherry KELSEY    Note: Discharging nurse to complete JAYDEN, reconcile AVS, and place final copy with patient's discharge packet. RN to ensure that written prescriptions for  Level II medications are sent with patient to the facility as per protocol.

## 2024-08-23 NOTE — PLAN OF CARE
Problem: Discharge Planning  Goal: Discharge to home or other facility with appropriate resources  8/22/2024 2126 by Hallie Paul RN  Outcome: Progressing  8/22/2024 1527 by Sia Crum RN  Outcome: Progressing     Problem: Pain  Goal: Verbalizes/displays adequate comfort level or baseline comfort level  8/22/2024 2126 by Hallie Paul RN  Outcome: Progressing  8/22/2024 1527 by Sia Crum RN  Outcome: Progressing     Problem: Gastrointestinal - Adult  Goal: Maintains or returns to baseline bowel function  Outcome: Progressing  Goal: Maintains adequate nutritional intake  Outcome: Progressing

## 2024-08-23 NOTE — PROGRESS NOTES
Pt given written and verbal discharge instructions. Pt indicated understanding of home medication and care instructions. Prescriptions sent to pt preferred pharmacy. Pt packed own belongings. Pt self ambulated to private vehicle.

## 2024-08-23 NOTE — PROGRESS NOTES
Evening meds given. No BM's since admission. Pt aware to call if he has a bloody BM. Pt asked for box lunch and diet pepsi. Gave both to pt at this time. Pt denies abd pain or cramping. Hallie Paul RN

## 2024-08-23 NOTE — DISCHARGE SUMMARY
Name:  Manjinder Choi  Room:  0211/0211-02  MRN:    [id number]    Discharge Summary      This discharge summary is in conjunction with a complete physical exam done on the day of discharge.      Discharging Physician: JONAS ALVAREZ MD      Admit: 8/21/2024  Discharge:  8/23/2024     Diagnoses this Admission    Principal Problem:    Lower GI bleed  Active Problems:    Type 2 diabetes mellitus without complication, with long-term current use of insulin (HCC)    Coronary artery disease due to lipid rich plaque    Hyperglycemia  Resolved Problems:    * No resolved hospital problems. *          Procedures (Please Review Full Report for Details)      Consults    IP CONSULT TO HOSPITALIST  IP CONSULT TO GI  IP CONSULT TO GI      HPI:    The patient is a 54 y.o. male with pmhx of CVA, CAD, DM, HTN, HLD, anxiety, depression who presented to [facility] ED with complaint of rectal bleeding. Patient reports this has been ongoing issue for several months, it will wax and wane. Thought it was just due to hemorrhoids, but he will get in the shower and blood will \"poor from his rectum\". It has gotten so bad he wears pads daily 2/2 to symptoms. Today he bled through his pad, shorts and even onto the truck. No abdominal pain, vomiting, diarrhea. Takes baby asa but no other NSAIDs.   Did have colonoscopy done in the last year with just polyps.   Does also report feeling extremely fatigued all the time and easily falling asleep throughout the day. He is currently following with pulmonology and getting sleep study done but has been told he could also have narcolepsy.       Physical Exam at Discharge:  BP (!) 145/64   Pulse 60   Temp 98.1 °F (36.7 °C) (Oral)   Resp 18   Ht 1.727 m (5' 8\")   Wt 135.1 kg (297 lb 12.8 oz)   SpO2 93%   BMI 45.28 kg/m²     General:  Awake, alert and oriented. Appears to be not in any distress  Mucous Membranes:  Pink , anicteric  Neck: No JVD, no carotid bruit, no thyromegaly  Chest:   tablet  Commonly known as: GLUCOTROL  Take 1 tablet by mouth 2 times daily (before meals)     hydroCHLOROthiazide 12.5 MG capsule  Take 1 capsule by mouth every morning     insulin glargine 100 UNIT/ML injection vial  Commonly known as: Lantus  Inject 85 Units into the skin nightly     * Insulin Syringe-Needle U-100 30G X 1/2\" 0.5 ML Misc  1 each by Does not apply route daily     * B-D INS SYR ULTRAFINE 1CC/30G 30G X 1/2\" 1 ML Misc  Generic drug: Insulin Syringe-Needle U-100     Jardiance 25 MG tablet  Generic drug: empagliflozin     lisinopril 20 MG tablet  Commonly known as: PRINIVIL;ZESTRIL  Take 1 tablet by mouth daily     Mounjaro 2.5 MG/0.5ML Sopn SC injection  Generic drug: Tirzepatide  Inject 0.5 mLs into the skin once a week     pantoprazole 40 MG tablet  Commonly known as: Protonix  Take 1 tablet by mouth in the morning and at bedtime           * This list has 2 medication(s) that are the same as other medications prescribed for you. Read the directions carefully, and ask your doctor or other care provider to review them with you.                STOP taking these medications      linagliptin 5 MG tablet  Commonly known as: Tradjenta     ondansetron 4 MG disintegrating tablet  Commonly known as: ZOFRAN-ODT               Where to Get Your Medications        Information about where to get these medications is not yet available    Ask your nurse or doctor about these medications  insulin glargine 100 UNIT/ML injection vial           Discharge Condition/Location: Stable    Follow Up:  Follow up with PCP.        JONAS ALVAREZ MD 8/23/2024 9:36 AM

## 2024-08-27 ENCOUNTER — TELEPHONE (OUTPATIENT)
Dept: FAMILY MEDICINE CLINIC | Age: 54
End: 2024-08-27

## 2024-08-27 NOTE — TELEPHONE ENCOUNTER
Care Transitions Initial Follow Up Call    Outreach made within 2 business days of discharge: Yes    Patient: Manjinder Choi Patient : 1970   MRN: [id number]  Reason for Admission: GI Bleed  Discharge Date: 24       Spoke with: Left a VM    Discharge department/facility: [facility] Patient Contact:  Was patient able to fill all prescriptions: No: Left a VM  Was patient instructed to bring all medications to the follow-up visit: No: Left a VM  Is patient taking all medications as directed in the discharge summary? Left a VM  Does patient understand their discharge instructions: No: Left a VM  Does patient have questions or concerns that need addressed prior to 7-14 day follow up office visit: no Left a VM    Additional needs identified to be addressed with provider  Left a VM             Scheduled appointment with PCP within 7-14 days    Follow Up  Future Appointments   Date Time Provider Department Center   2024  8:30 PM [facility] Banksclay Krause MA

## 2024-08-28 NOTE — PROGRESS NOTES
Physician Progress Note      PATIENT:               SRINATH HEADLEY  CSN #:                  [id number]  :                       1970  ADMIT DATE:       2024 12:45 PM  DISCH DATE:        2024 2:57 PM  RESPONDING  PROVIDER #:        Junior Chao MD          QUERY TEXT:    Internal Medicine,    Pt admitted with rectal bleeding. Gastroenterology documents bleeding likely   due to hemorrhoids. No cause is documented by the attending.  If possible,   please document in progress notes and discharge summary:    The medical record reflects the following:  Risk Factors: hemorrhoids  Clinical Indicators: hemoccult,  Gastroenterology documents bleeding likely   due to hemorrhoids. No cause is documented by the attending.  Treatment: labs, imaging, GI consul, medical management    Thank you,  Josefina Braga RN CDS  Options provided:  -- Rectal bleeding due to hemorrhoids confirmed present on admission  -- Rectal bleeding due to, please document cause  -- Other - I will add my own diagnosis  -- Disagree - Not applicable / Not valid  -- Disagree - Clinically unable to determine / Unknown  -- Refer to Clinical Documentation Reviewer    PROVIDER RESPONSE TEXT:    The diagnosis of rectal bleeding due to hemorrhoids confirmed present on   admission    Query created by: Josefina Braga on 2024 2:58 PM      Electronically signed by:  Junior Chao MD 2024 7:37 AM

## 2024-09-11 ENCOUNTER — TELEPHONE (OUTPATIENT)
Dept: FAMILY MEDICINE CLINIC | Age: 54
End: 2024-09-11

## 2024-09-11 DIAGNOSIS — B37.9 YEAST INFECTION: Primary | ICD-10-CM

## 2024-09-11 RX ORDER — FLUCONAZOLE 150 MG/1
150 TABLET ORAL
Qty: 3 TABLET | Refills: 0 | Status: SHIPPED | OUTPATIENT
Start: 2024-09-11

## 2024-09-11 RX ORDER — NYSTATIN 100000 U/G
CREAM TOPICAL
Qty: 30 G | Refills: 0 | Status: SHIPPED | OUTPATIENT
Start: 2024-09-11

## 2024-09-24 ENCOUNTER — HOSPITAL ENCOUNTER (OUTPATIENT)
Dept: SLEEP CENTER | Age: 54
Discharge: HOME | End: 2024-09-24
Payer: COMMERCIAL

## 2024-09-24 DIAGNOSIS — G47.10 HYPERSOMNIA: ICD-10-CM

## 2024-09-25 PROCEDURE — 95811 POLYSOM 6/>YRS CPAP 4/> PARM: CPT

## 2024-09-26 ENCOUNTER — TELEPHONE (OUTPATIENT)
Dept: FAMILY MEDICINE CLINIC | Age: 54
End: 2024-09-26

## 2024-09-30 ENCOUNTER — TELEPHONE (OUTPATIENT)
Dept: PULMONOLOGY | Age: 54
End: 2024-09-30

## 2024-10-01 PROBLEM — G47.10 HYPERSOMNIA: Status: ACTIVE | Noted: 2024-10-01

## 2024-10-01 NOTE — PROGRESS NOTES
Ordering Provider:   Sridhar Millan MD - Diplomate, [facility]  [address], [address]  [place] [address]    [facility]  [address], [address]  [place], [place] [address]    Phone [phone]  Fax [phone]    Diagnosis: [x] TOBIN  (G47.33) [] CSA (G47.31) []  Other:__________________   Length of Need: [] 13 months [x]  99 Months                                          []  Other:__________________   Machine (GUILLERMO): [] Respironics Auto (with modem for remote monitoring)       [] Other:____________________    [x]  ResMed Auto (with modem for remote monitoring)    []  CPAP () [x] Bilevel ()   Mode: Mode:   [] Auto [] Fixed [x] Auto [] Spontaneous   Pmin:_________cmH2O      Pmax:_________cmH2O   P:_________cmH2O    EPAPmin:_____12____cmH2O IPAP:__________cmH2O     IPAPmax:______25___cmH2O EPAP:__________cmH2O     PSmin:_______  PSmax:_______       (ResMed) PS:___4______     Flex/EPR - 3 full time                          Ramp time: 30 min Flex/EPR - 3 full time                 Ramp time: 30 min   Ramp Pressure:___________cmH2O Ramp Pressure:____________cmH2O         Humidifier: [x] Heated ()                                               [] Passive     [x] Water chamber replacement ()/ 1 per 6 months   Mask:   [x] Nasal () /1 per 3 months [] Full Face () /1 per 3 months   [x] Patient choice -Size and fit mask [] Patient Choice - Size and fit mask   [x] Dispense: nasal cushion  [] Dispense:  [] Dispense: full face mask  [] Dispense:    [x] Headgear () / 1 per 3 months [] Headgear () / 1 per 3 months   [x] Replacement Nasal Cushion ()/2 per month [] Interface Replacement ()/1 per month   [] Replacement Nasal Pillows ()/2 per month       Tubing: [x] Heated ()/1 per 3 months                           [] Standard ()/1 per 3 months  [] Other:____________________   Filters: [x]

## 2024-10-01 NOTE — TELEPHONE ENCOUNTER
Please inform patient based on the recent split night study, we will proceed with treating his obstructive sleep apnea with auto Bilevel PAP.    I will order a PAP device via a durable medical equipment company of their choice (if no preference, we will go with [employer] based on location) and they will reach out to keep him updated on the process. This will be the company that is going to work with his insurance and provide him the PAP device, along with replacing the mask and other supplies going forward. If they have any questions, they can let you know or message me via [employer]. If patient agrees with plan, please route the PAP order to DME company (order already completed on a separate order encounter). Patient should be scheduled for 31-90 days follow up.    Thanks  Sridhar Millan MD

## 2024-10-02 NOTE — TELEPHONE ENCOUNTER
Spoke with pt to review Split night results.  Order to be sent to [facility]. Message sent to Ann to schedule f/u.

## 2024-10-18 DIAGNOSIS — G45.9 TIA (TRANSIENT ISCHEMIC ATTACK): ICD-10-CM

## 2024-10-18 DIAGNOSIS — F41.9 ANXIETY AND DEPRESSION: ICD-10-CM

## 2024-10-18 DIAGNOSIS — I25.10 CORONARY ARTERY DISEASE INVOLVING NATIVE CORONARY ARTERY OF NATIVE HEART WITHOUT ANGINA PECTORIS: ICD-10-CM

## 2024-10-18 DIAGNOSIS — F32.A ANXIETY AND DEPRESSION: ICD-10-CM

## 2024-10-21 RX ORDER — ESCITALOPRAM OXALATE 10 MG/1
10 TABLET ORAL DAILY
Qty: 90 TABLET | Refills: 3 | Status: SHIPPED | OUTPATIENT
Start: 2024-10-21

## 2024-10-21 RX ORDER — ASPIRIN 81 MG/1
81 TABLET ORAL DAILY
Qty: 90 TABLET | Refills: 3 | Status: SHIPPED | OUTPATIENT
Start: 2024-10-21

## 2024-10-22 ENCOUNTER — TELEPHONE (OUTPATIENT)
Dept: FAMILY MEDICINE CLINIC | Age: 54
End: 2024-10-22

## 2024-10-22 DIAGNOSIS — E11.65 TYPE 2 DIABETES MELLITUS WITH HYPERGLYCEMIA, WITHOUT LONG-TERM CURRENT USE OF INSULIN (HCC): ICD-10-CM

## 2024-10-22 RX ORDER — INSULIN GLARGINE 100 [IU]/ML
85 INJECTION, SOLUTION SUBCUTANEOUS NIGHTLY
Qty: 1 ADJUSTABLE DOSE PRE-FILLED PEN SYRINGE | Refills: 0 | Status: SHIPPED | OUTPATIENT
Start: 2024-10-22

## 2024-10-22 RX ORDER — FLUCONAZOLE 150 MG/1
150 TABLET ORAL
Qty: 3 TABLET | Refills: 0 | Status: SHIPPED | OUTPATIENT
Start: 2024-10-22 | End: 2024-10-31

## 2024-10-22 NOTE — TELEPHONE ENCOUNTER
Patient also complains of frequent/recurrent yeast infections, asked if you could prescribe something for this?   No

## 2024-10-22 NOTE — TELEPHONE ENCOUNTER
Patient complains of recurrent yeast infections, has one now. Asked for script to be sent to pharmacy for this?

## 2024-10-22 NOTE — TELEPHONE ENCOUNTER
Likely related to elevated sugars and jardiance.  Ok for diflucan 150mg po q72h x 3 doses.  O/w, will need f/u appt to eval dm control and consider other med options if ongoing sxs

## 2024-11-04 ENCOUNTER — OFFICE VISIT (OUTPATIENT)
Dept: FAMILY MEDICINE CLINIC | Age: 54
End: 2024-11-04
Payer: COMMERCIAL

## 2024-11-04 VITALS
SYSTOLIC BLOOD PRESSURE: 136 MMHG | WEIGHT: 310 LBS | HEART RATE: 66 BPM | HEIGHT: 68 IN | DIASTOLIC BLOOD PRESSURE: 82 MMHG | OXYGEN SATURATION: 95 % | BODY MASS INDEX: 46.98 KG/M2

## 2024-11-04 DIAGNOSIS — Z79.4 TYPE 2 DIABETES MELLITUS WITH HYPERGLYCEMIA, WITH LONG-TERM CURRENT USE OF INSULIN (HCC): Primary | ICD-10-CM

## 2024-11-04 DIAGNOSIS — E11.65 TYPE 2 DIABETES MELLITUS WITH HYPERGLYCEMIA, WITH LONG-TERM CURRENT USE OF INSULIN (HCC): Primary | ICD-10-CM

## 2024-11-04 DIAGNOSIS — B37.9 YEAST INFECTION: ICD-10-CM

## 2024-11-04 PROCEDURE — G8484 FLU IMMUNIZE NO ADMIN: HCPCS | Performed by: FAMILY MEDICINE

## 2024-11-04 PROCEDURE — 3017F COLORECTAL CA SCREEN DOC REV: CPT | Performed by: FAMILY MEDICINE

## 2024-11-04 PROCEDURE — 3075F SYST BP GE 130 - 139MM HG: CPT | Performed by: FAMILY MEDICINE

## 2024-11-04 PROCEDURE — G8417 CALC BMI ABV UP PARAM F/U: HCPCS | Performed by: FAMILY MEDICINE

## 2024-11-04 PROCEDURE — 99214 OFFICE O/P EST MOD 30 MIN: CPT | Performed by: FAMILY MEDICINE

## 2024-11-04 PROCEDURE — 3079F DIAST BP 80-89 MM HG: CPT | Performed by: FAMILY MEDICINE

## 2024-11-04 PROCEDURE — 2022F DILAT RTA XM EVC RTNOPTHY: CPT | Performed by: FAMILY MEDICINE

## 2024-11-04 PROCEDURE — G8427 DOCREV CUR MEDS BY ELIG CLIN: HCPCS | Performed by: FAMILY MEDICINE

## 2024-11-04 PROCEDURE — 1036F TOBACCO NON-USER: CPT | Performed by: FAMILY MEDICINE

## 2024-11-04 PROCEDURE — 3046F HEMOGLOBIN A1C LEVEL >9.0%: CPT | Performed by: FAMILY MEDICINE

## 2024-11-04 RX ORDER — FERROUS SULFATE 325(65) MG
325 TABLET ORAL
COMMUNITY

## 2024-11-04 RX ORDER — INSULIN GLARGINE 100 [IU]/ML
100 INJECTION, SOLUTION SUBCUTANEOUS NIGHTLY
Qty: 30 ML | Refills: 3 | Status: SHIPPED | OUTPATIENT
Start: 2024-11-04

## 2024-11-04 RX ORDER — FLUCONAZOLE 150 MG/1
150 TABLET ORAL
Qty: 3 TABLET | Refills: 0 | Status: SHIPPED | OUTPATIENT
Start: 2024-11-04

## 2024-11-04 RX ORDER — TIRZEPATIDE 2.5 MG/.5ML
2.5 INJECTION, SOLUTION SUBCUTANEOUS WEEKLY
Qty: 2 ML | Refills: 0 | Status: SHIPPED | COMMUNITY
Start: 2024-11-04

## 2024-11-04 RX ORDER — INSULIN DEGLUDEC 200 U/ML
100 INJECTION, SOLUTION SUBCUTANEOUS NIGHTLY
Qty: 1 ADJUSTABLE DOSE PRE-FILLED PEN SYRINGE | Refills: 0 | Status: SHIPPED | COMMUNITY
Start: 2024-11-04

## 2024-11-04 RX ORDER — NYSTATIN 100000 U/G
CREAM TOPICAL
Qty: 30 G | Refills: 2 | Status: SHIPPED | OUTPATIENT
Start: 2024-11-04

## 2024-11-04 ASSESSMENT — ENCOUNTER SYMPTOMS: SHORTNESS OF BREATH: 0

## 2024-11-04 NOTE — PROGRESS NOTES
Chief Complaint   Patient presents with    Discuss Medications       HPI:  Manjinder Choi is a 54 y.o. (: 1970) here today   for discuss medications.  Diabetes  He presents for his follow-up diabetic visit. He has type 2 diabetes mellitus. Risk factors for coronary artery disease include male sex and obesity. Current diabetic treatment includes insulin injections and oral agent (dual therapy).     When runs out of lantus, sugar has been sig elevated.       Has been having issues w/ yeast.  Mj under pannus.  Some in groin as well.      Patient's medications, allergies, past medical, surgical, social and family histories were reviewed and updated as appropriate.    ROS:  Review of Systems   Constitutional:  Negative for fever.   Respiratory:  Negative for shortness of breath.    Genitourinary:  Positive for frequency.   Skin:  Positive for rash.           Hemoglobin A1C (%)   Date Value   2024 11.2     LDL Direct (mg/dL)   Date Value   2023 140 (H)       Past Medical History:   Diagnosis Date    Coronary artery disease involving native heart without angina pectoris     Gastro-esophageal reflux disease without esophagitis 10/24/2017    Mixed hyperglyceridemia     Nonrheumatic tricuspid valve regurgitation     Sleep apnea     TIA (transient ischemic attack)     2019 &     Tobacco use 10/24/2017       Family History   Problem Relation Age of Onset    Diabetes Mother     High Blood Pressure Mother     Heart Disease Mother     Heart Disease Father     COPD Father     High Blood Pressure Father     Diabetes Sister        Social History     Socioeconomic History    Marital status:      Spouse name: Not on file    Number of children: Not on file    Years of education: Not on file    Highest education level: Not on file   Occupational History    Not on file   Tobacco Use    Smoking status: Never    Smokeless tobacco: Former     Types: Snuff, Chew   Vaping Use    Vaping status: Never Used  (4) excellent

## 2024-11-05 DIAGNOSIS — Z79.4 TYPE 2 DIABETES MELLITUS WITH HYPERGLYCEMIA, WITH LONG-TERM CURRENT USE OF INSULIN (HCC): ICD-10-CM

## 2024-11-05 DIAGNOSIS — E11.65 TYPE 2 DIABETES MELLITUS WITH HYPERGLYCEMIA, WITH LONG-TERM CURRENT USE OF INSULIN (HCC): ICD-10-CM

## 2025-02-03 ENCOUNTER — OFFICE VISIT (OUTPATIENT)
Dept: FAMILY MEDICINE CLINIC | Age: 55
End: 2025-02-03
Payer: COMMERCIAL

## 2025-02-03 VITALS
WEIGHT: 310 LBS | BODY MASS INDEX: 46.98 KG/M2 | OXYGEN SATURATION: 94 % | DIASTOLIC BLOOD PRESSURE: 80 MMHG | SYSTOLIC BLOOD PRESSURE: 142 MMHG | HEART RATE: 68 BPM | HEIGHT: 68 IN

## 2025-02-03 DIAGNOSIS — E78.2 MIXED HYPERLIPIDEMIA: ICD-10-CM

## 2025-02-03 DIAGNOSIS — I25.83 CORONARY ARTERY DISEASE DUE TO LIPID RICH PLAQUE: ICD-10-CM

## 2025-02-03 DIAGNOSIS — G47.33 OSA (OBSTRUCTIVE SLEEP APNEA): ICD-10-CM

## 2025-02-03 DIAGNOSIS — I10 ESSENTIAL (PRIMARY) HYPERTENSION: ICD-10-CM

## 2025-02-03 DIAGNOSIS — E11.65 TYPE 2 DIABETES MELLITUS WITH HYPERGLYCEMIA, WITH LONG-TERM CURRENT USE OF INSULIN (HCC): Primary | ICD-10-CM

## 2025-02-03 DIAGNOSIS — Z79.4 TYPE 2 DIABETES MELLITUS WITH HYPERGLYCEMIA, WITH LONG-TERM CURRENT USE OF INSULIN (HCC): Primary | ICD-10-CM

## 2025-02-03 DIAGNOSIS — G45.9 TIA (TRANSIENT ISCHEMIC ATTACK): ICD-10-CM

## 2025-02-03 DIAGNOSIS — I25.10 CORONARY ARTERY DISEASE DUE TO LIPID RICH PLAQUE: ICD-10-CM

## 2025-02-03 PROBLEM — E08.00 DIABETES MELLITUS DUE TO UNDERLYING CONDITION WITH HYPEROSMOLARITY WITHOUT COMA, WITH LONG-TERM CURRENT USE OF INSULIN (HCC): Status: ACTIVE | Noted: 2025-02-03

## 2025-02-03 LAB
ALBUMIN SERPL-MCNC: 3.9 G/DL (ref 3.4–5)
ALBUMIN/GLOB SERPL: 1.2 {RATIO} (ref 1.1–2.2)
ALP SERPL-CCNC: 113 U/L (ref 40–129)
ALT SERPL-CCNC: 18 U/L (ref 10–40)
ANION GAP SERPL CALCULATED.3IONS-SCNC: 7 MMOL/L (ref 3–16)
AST SERPL-CCNC: 15 U/L (ref 15–37)
BILIRUB SERPL-MCNC: 0.3 MG/DL (ref 0–1)
BUN SERPL-MCNC: 16 MG/DL (ref 7–20)
CALCIUM SERPL-MCNC: 9.5 MG/DL (ref 8.3–10.6)
CHLORIDE SERPL-SCNC: 99 MMOL/L (ref 99–110)
CHOLEST SERPL-MCNC: 155 MG/DL (ref 0–199)
CO2 SERPL-SCNC: 31 MMOL/L (ref 21–32)
CREAT SERPL-MCNC: 0.8 MG/DL (ref 0.9–1.3)
CREAT UR-MCNC: 64.1 MG/DL (ref 39–259)
DEPRECATED RDW RBC AUTO: 14.1 % (ref 12.4–15.4)
EST. AVERAGE GLUCOSE BLD GHB EST-MCNC: 314.9 MG/DL
GFR SERPLBLD CREATININE-BSD FMLA CKD-EPI: >90 ML/MIN/{1.73_M2}
GLUCOSE SERPL-MCNC: 203 MG/DL (ref 70–99)
HBA1C MFR BLD: 12.6 %
HCT VFR BLD AUTO: 44.7 % (ref 40.5–52.5)
HDLC SERPL-MCNC: 33 MG/DL (ref 40–60)
HGB BLD-MCNC: 14.7 G/DL (ref 13.5–17.5)
LDLC SERPL CALC-MCNC: 99 MG/DL
MCH RBC QN AUTO: 28.2 PG (ref 26–34)
MCHC RBC AUTO-ENTMCNC: 33 G/DL (ref 31–36)
MCV RBC AUTO: 85.5 FL (ref 80–100)
MICROALBUMIN UR DL<=1MG/L-MCNC: <1.2 MG/DL
MICROALBUMIN/CREAT UR: NORMAL MG/G (ref 0–30)
PLATELET # BLD AUTO: 284 K/UL (ref 135–450)
PMV BLD AUTO: 8.7 FL (ref 5–10.5)
POTASSIUM SERPL-SCNC: 4.6 MMOL/L (ref 3.5–5.1)
PROT SERPL-MCNC: 7.2 G/DL (ref 6.4–8.2)
RBC # BLD AUTO: 5.23 M/UL (ref 4.2–5.9)
SODIUM SERPL-SCNC: 137 MMOL/L (ref 136–145)
TRIGL SERPL-MCNC: 115 MG/DL (ref 0–150)
TSH SERPL DL<=0.005 MIU/L-ACNC: 2.97 UIU/ML (ref 0.27–4.2)
VLDLC SERPL CALC-MCNC: 23 MG/DL
WBC # BLD AUTO: 9.2 K/UL (ref 4–11)

## 2025-02-03 PROCEDURE — 2022F DILAT RTA XM EVC RTNOPTHY: CPT | Performed by: FAMILY MEDICINE

## 2025-02-03 PROCEDURE — 99214 OFFICE O/P EST MOD 30 MIN: CPT | Performed by: FAMILY MEDICINE

## 2025-02-03 PROCEDURE — 3079F DIAST BP 80-89 MM HG: CPT | Performed by: FAMILY MEDICINE

## 2025-02-03 PROCEDURE — 1036F TOBACCO NON-USER: CPT | Performed by: FAMILY MEDICINE

## 2025-02-03 PROCEDURE — 3017F COLORECTAL CA SCREEN DOC REV: CPT | Performed by: FAMILY MEDICINE

## 2025-02-03 PROCEDURE — 3077F SYST BP >= 140 MM HG: CPT | Performed by: FAMILY MEDICINE

## 2025-02-03 PROCEDURE — G8427 DOCREV CUR MEDS BY ELIG CLIN: HCPCS | Performed by: FAMILY MEDICINE

## 2025-02-03 PROCEDURE — 3046F HEMOGLOBIN A1C LEVEL >9.0%: CPT | Performed by: FAMILY MEDICINE

## 2025-02-03 PROCEDURE — 36415 COLL VENOUS BLD VENIPUNCTURE: CPT | Performed by: FAMILY MEDICINE

## 2025-02-03 PROCEDURE — G8417 CALC BMI ABV UP PARAM F/U: HCPCS | Performed by: FAMILY MEDICINE

## 2025-02-03 RX ORDER — INSULIN LISPRO 100 [IU]/ML
15 INJECTION, SOLUTION INTRAVENOUS; SUBCUTANEOUS
Qty: 13.5 ML | Refills: 2 | Status: SHIPPED | OUTPATIENT
Start: 2025-02-03 | End: 2025-05-04

## 2025-02-03 ASSESSMENT — PATIENT HEALTH QUESTIONNAIRE - PHQ9
2. FEELING DOWN, DEPRESSED OR HOPELESS: NOT AT ALL
SUM OF ALL RESPONSES TO PHQ9 QUESTIONS 1 & 2: 2
1. LITTLE INTEREST OR PLEASURE IN DOING THINGS: MORE THAN HALF THE DAYS
6. FEELING BAD ABOUT YOURSELF - OR THAT YOU ARE A FAILURE OR HAVE LET YOURSELF OR YOUR FAMILY DOWN: NOT AT ALL
8. MOVING OR SPEAKING SO SLOWLY THAT OTHER PEOPLE COULD HAVE NOTICED. OR THE OPPOSITE, BEING SO FIGETY OR RESTLESS THAT YOU HAVE BEEN MOVING AROUND A LOT MORE THAN USUAL: NOT AT ALL
7. TROUBLE CONCENTRATING ON THINGS, SUCH AS READING THE NEWSPAPER OR WATCHING TELEVISION: NOT AT ALL
SUM OF ALL RESPONSES TO PHQ QUESTIONS 1-9: 4
3. TROUBLE FALLING OR STAYING ASLEEP: NOT AT ALL
SUM OF ALL RESPONSES TO PHQ QUESTIONS 1-9: 4
9. THOUGHTS THAT YOU WOULD BE BETTER OFF DEAD, OR OF HURTING YOURSELF: NOT AT ALL
4. FEELING TIRED OR HAVING LITTLE ENERGY: SEVERAL DAYS
5. POOR APPETITE OR OVEREATING: SEVERAL DAYS
10. IF YOU CHECKED OFF ANY PROBLEMS, HOW DIFFICULT HAVE THESE PROBLEMS MADE IT FOR YOU TO DO YOUR WORK, TAKE CARE OF THINGS AT HOME, OR GET ALONG WITH OTHER PEOPLE: SOMEWHAT DIFFICULT

## 2025-02-03 NOTE — ASSESSMENT & PLAN NOTE
Try to continue use of the CPAP.  Not using regularly.  We did discuss finding alternate mask.  Consider adding humidifier.

## 2025-02-03 NOTE — PROGRESS NOTES
Manjinder Choi (:  1970) is a 55 y.o. male,Established patient, here for evaluation of the following chief complaint(s):  Diabetes         Assessment & Plan  Type 2 diabetes mellitus with hyperglycemia, with long-term current use of insulin (HCC)  Did not tolerate metformin previously.  Indicates he had issues with Trulicity.  It may have been an interaction of the 2 medications.  Patient indicates he had been on metformin for 2 years before developing the intolerance.  He does wear the Dexcom average blood sugar has been close to 350.  We are going to get lab work today.  Will start mealtime insulin.  Referral for diabetic eye exam    Orders:    Hemoglobin A1C    Lipid Panel    Comprehensive Metabolic Panel    Albumin/Creatinine Ratio, Urine    CBC    TSH reflex to T4F    insulin lispro, 1 Unit Dial, (HUMALOG KWIKPEN) 100 UNIT/ML SOPN; Inject 15 Units into the skin 3 times daily (before meals)    Curly Meza MD, Ophthalmology, [facility]    Coronary artery disease due to lipid rich plaque    Continue aspirin atorvastatin.  We need to continue to work on glucose control         Essential (primary) hypertension    Blood pressure mildly elevated continue hydrochlorothiazide lisinopril combination.         Mixed hyperlipidemia    Continue atorvastatin         TIA (transient ischemic attack)    Continue aspirin         TOBIN (obstructive sleep apnea)    Try to continue use of the CPAP.  Not using regularly.  We did discuss finding alternate mask.  Consider adding humidifier.           Return in about 3 months (around 5/3/2025) for dm2.       Subjective   HPI  55-year-old male here for a follow-up visit.  Uncontrolled diabetes.  Currently on Lantus 100.  He is using Jardiance from cardiology.  Was unable to tolerate Trulicity in combination with metformin both of those were discontinued.  We did discuss reinstituting however he would like to do something else at this time.  Review of Systems   No

## 2025-04-07 DIAGNOSIS — E11.65 TYPE 2 DIABETES MELLITUS WITH HYPERGLYCEMIA, WITHOUT LONG-TERM CURRENT USE OF INSULIN (HCC): ICD-10-CM

## 2025-04-07 RX ORDER — BLOOD SUGAR DIAGNOSTIC
1 STRIP MISCELLANEOUS NIGHTLY
Qty: 100 EACH | Refills: 5 | Status: SHIPPED | OUTPATIENT
Start: 2025-04-07

## 2025-05-08 ENCOUNTER — OFFICE VISIT (OUTPATIENT)
Dept: FAMILY MEDICINE CLINIC | Age: 55
End: 2025-05-08
Payer: COMMERCIAL

## 2025-05-08 VITALS
WEIGHT: 309 LBS | HEART RATE: 78 BPM | OXYGEN SATURATION: 98 % | RESPIRATION RATE: 20 BRPM | DIASTOLIC BLOOD PRESSURE: 80 MMHG | SYSTOLIC BLOOD PRESSURE: 136 MMHG | BODY MASS INDEX: 46.98 KG/M2

## 2025-05-08 DIAGNOSIS — Z79.4 TYPE 2 DIABETES MELLITUS WITH HYPERGLYCEMIA, WITH LONG-TERM CURRENT USE OF INSULIN (HCC): Primary | ICD-10-CM

## 2025-05-08 DIAGNOSIS — E66.01 SEVERE OBESITY (BMI >= 40) (HCC): ICD-10-CM

## 2025-05-08 DIAGNOSIS — K21.9 GASTROESOPHAGEAL REFLUX DISEASE, UNSPECIFIED WHETHER ESOPHAGITIS PRESENT: ICD-10-CM

## 2025-05-08 DIAGNOSIS — E78.2 MIXED HYPERLIPIDEMIA: ICD-10-CM

## 2025-05-08 DIAGNOSIS — B37.9 YEAST INFECTION: ICD-10-CM

## 2025-05-08 DIAGNOSIS — I10 ESSENTIAL (PRIMARY) HYPERTENSION: ICD-10-CM

## 2025-05-08 DIAGNOSIS — I25.10 CORONARY ARTERY DISEASE DUE TO LIPID RICH PLAQUE: ICD-10-CM

## 2025-05-08 DIAGNOSIS — G45.9 TIA (TRANSIENT ISCHEMIC ATTACK): ICD-10-CM

## 2025-05-08 DIAGNOSIS — F41.9 ANXIETY AND DEPRESSION: ICD-10-CM

## 2025-05-08 DIAGNOSIS — I25.83 CORONARY ARTERY DISEASE DUE TO LIPID RICH PLAQUE: ICD-10-CM

## 2025-05-08 DIAGNOSIS — F32.A ANXIETY AND DEPRESSION: ICD-10-CM

## 2025-05-08 DIAGNOSIS — E11.65 TYPE 2 DIABETES MELLITUS WITH HYPERGLYCEMIA, WITH LONG-TERM CURRENT USE OF INSULIN (HCC): Primary | ICD-10-CM

## 2025-05-08 LAB — HBA1C MFR BLD: 13 %

## 2025-05-08 PROCEDURE — 1036F TOBACCO NON-USER: CPT | Performed by: FAMILY MEDICINE

## 2025-05-08 PROCEDURE — 99214 OFFICE O/P EST MOD 30 MIN: CPT | Performed by: FAMILY MEDICINE

## 2025-05-08 PROCEDURE — G8427 DOCREV CUR MEDS BY ELIG CLIN: HCPCS | Performed by: FAMILY MEDICINE

## 2025-05-08 PROCEDURE — 3046F HEMOGLOBIN A1C LEVEL >9.0%: CPT | Performed by: FAMILY MEDICINE

## 2025-05-08 PROCEDURE — 3017F COLORECTAL CA SCREEN DOC REV: CPT | Performed by: FAMILY MEDICINE

## 2025-05-08 PROCEDURE — 2022F DILAT RTA XM EVC RTNOPTHY: CPT | Performed by: FAMILY MEDICINE

## 2025-05-08 PROCEDURE — 83036 HEMOGLOBIN GLYCOSYLATED A1C: CPT | Performed by: FAMILY MEDICINE

## 2025-05-08 PROCEDURE — G8417 CALC BMI ABV UP PARAM F/U: HCPCS | Performed by: FAMILY MEDICINE

## 2025-05-08 PROCEDURE — 3079F DIAST BP 80-89 MM HG: CPT | Performed by: FAMILY MEDICINE

## 2025-05-08 PROCEDURE — 3075F SYST BP GE 130 - 139MM HG: CPT | Performed by: FAMILY MEDICINE

## 2025-05-08 RX ORDER — ATORVASTATIN CALCIUM 20 MG/1
20 TABLET, FILM COATED ORAL DAILY
Qty: 90 TABLET | Refills: 3 | Status: SHIPPED | OUTPATIENT
Start: 2025-05-08

## 2025-05-08 RX ORDER — ASPIRIN 81 MG/1
81 TABLET ORAL DAILY
Qty: 90 TABLET | Refills: 3 | Status: SHIPPED | OUTPATIENT
Start: 2025-05-08

## 2025-05-08 RX ORDER — NYSTATIN 100000 U/G
CREAM TOPICAL
Qty: 30 G | Refills: 2 | Status: SHIPPED | OUTPATIENT
Start: 2025-05-08

## 2025-05-08 RX ORDER — LISINOPRIL 20 MG/1
20 TABLET ORAL DAILY
Qty: 30 TABLET | Refills: 3 | Status: SHIPPED | OUTPATIENT
Start: 2025-05-08

## 2025-05-08 RX ORDER — INSULIN LISPRO 100 [IU]/ML
35 INJECTION, SOLUTION INTRAVENOUS; SUBCUTANEOUS
Qty: 13.5 ML | Refills: 2 | Status: SHIPPED | OUTPATIENT
Start: 2025-05-08 | End: 2025-08-06

## 2025-05-08 RX ORDER — ESCITALOPRAM OXALATE 10 MG/1
10 TABLET ORAL DAILY
Qty: 90 TABLET | Refills: 3 | Status: SHIPPED | OUTPATIENT
Start: 2025-05-08

## 2025-05-08 RX ORDER — ACYCLOVIR 400 MG/1
1 TABLET ORAL
Qty: 9 EACH | Refills: 5 | Status: SHIPPED | OUTPATIENT
Start: 2025-05-08

## 2025-05-08 RX ORDER — PANTOPRAZOLE SODIUM 40 MG/1
40 TABLET, DELAYED RELEASE ORAL 2 TIMES DAILY
Qty: 180 TABLET | Refills: 3 | Status: SHIPPED | OUTPATIENT
Start: 2025-05-08

## 2025-05-08 RX ORDER — GLIPIZIDE 10 MG/1
10 TABLET ORAL
Qty: 180 TABLET | Refills: 3 | Status: SHIPPED | OUTPATIENT
Start: 2025-05-08 | End: 2026-05-03

## 2025-05-08 RX ORDER — INSULIN GLARGINE 100 [IU]/ML
100 INJECTION, SOLUTION SUBCUTANEOUS NIGHTLY
Qty: 30 ML | Refills: 3 | Status: SHIPPED | OUTPATIENT
Start: 2025-05-08

## 2025-05-08 RX ORDER — FERROUS SULFATE 325(65) MG
325 TABLET ORAL EVERY OTHER DAY
Qty: 45 TABLET | Refills: 3 | Status: SHIPPED | OUTPATIENT
Start: 2025-05-08 | End: 2026-05-03

## 2025-05-08 RX ORDER — BLOOD SUGAR DIAGNOSTIC
1 STRIP MISCELLANEOUS 4 TIMES DAILY
Qty: 400 EACH | Refills: 3 | Status: SHIPPED | OUTPATIENT
Start: 2025-05-08 | End: 2026-05-03

## 2025-05-08 RX ORDER — HYDROCHLOROTHIAZIDE 12.5 MG/1
12.5 CAPSULE ORAL EVERY MORNING
Qty: 90 CAPSULE | Refills: 3 | Status: SHIPPED | OUTPATIENT
Start: 2025-05-08

## 2025-05-08 RX ORDER — EMPAGLIFLOZIN 25 MG/1
25 TABLET, FILM COATED ORAL DAILY
Qty: 90 TABLET | Refills: 3 | Status: SHIPPED | OUTPATIENT
Start: 2025-05-08 | End: 2026-05-03

## 2025-05-08 RX ORDER — PEN NEEDLE, DIABETIC 31 GX5/16"
1 NEEDLE, DISPOSABLE MISCELLANEOUS DAILY
Qty: 100 EACH | Refills: 3 | Status: SHIPPED | OUTPATIENT
Start: 2025-05-08

## 2025-05-08 NOTE — ASSESSMENT & PLAN NOTE
Continue current medication    Orders:    pantoprazole (PROTONIX) 40 MG tablet; Take 1 tablet by mouth in the morning and at bedtime    ferrous sulfate (IRON 325) 325 (65 Fe) MG tablet; Take 1 tablet by mouth every other day

## 2025-05-08 NOTE — PROGRESS NOTES
Manjinder Choi (:  1970) is a 55 y.o. male,Established patient, here for evaluation of the following chief complaint(s):  Follow-up and Diabetes           Assessment & Plan  Type 2 diabetes mellitus with hyperglycemia, with long-term current use of insulin ([facility])    Continue Lantus 100 units daily, humalog 35 units with meals TID. Continue Jardiance. 3 month follow up.   A1c today at 13.  His Dexcom shows average blood sugar in the 140s and expected A1c at 9.1.  Will continue with current dose and repeat with venous lab draw at next follow-up    Orders:    POCT glycosylated hemoglobin (Hb A1C)    Continuous Glucose Sensor (DEXCOM G7 SENSOR) MISC; 1 each by Does not apply route every 14 days    glipiZIDE (GLUCOTROL) 10 MG tablet; Take 1 tablet by mouth 2 times daily (before meals)    LANTUS 100 UNIT/ML injection vial; Inject 100 Units into the skin nightly    insulin lispro, 1 Unit Dial, (HUMALOG KWIKPEN) 100 UNIT/ML SOPN; Inject 35 Units into the skin 3 times daily (before meals)    Insulin Syringe-Needle U-100 30G X 1/2\" 0.5 ML MISC; 1 each by Does not apply route daily    JARDIANCE 25 MG tablet; Take 1 tablet by mouth daily    Insulin Pen Needle (PEN NEEDLES 31GX5/16\") 31G X 8 MM MISC; 1 each by Does not apply route daily    Alcohol Swabs (ALCOHOL PADS) 70 % PADS; 1 each by Does not apply route 4 times daily    aspirin 81 MG EC tablet; Take 1 tablet by mouth daily    Coronary artery disease due to lipid rich plaque  Continue current treatment          Mixed hyperlipidemia    Continue atorvastatin    Orders:    atorvastatin (LIPITOR) 20 MG tablet; Take 1 tablet by mouth daily    Essential (primary) hypertension    Stable, continue hctz/lisinopril    Orders:    hydroCHLOROthiazide 12.5 MG capsule; Take 1 capsule by mouth every morning    lisinopril (PRINIVIL;ZESTRIL) 20 MG tablet; Take 1 tablet by mouth daily    Severe obesity (BMI >= 40) ([facility])    Continue to decrease calorie intake and work on exercise

## 2025-05-08 NOTE — ASSESSMENT & PLAN NOTE
Refilled nystatin PRN    Orders:    nystatin (MYCOSTATIN) 988062 UNIT/GM cream; Apply topically 2 times daily.

## 2025-05-08 NOTE — ASSESSMENT & PLAN NOTE
Stable, continue hctz/lisinopril    Orders:    hydroCHLOROthiazide 12.5 MG capsule; Take 1 capsule by mouth every morning    lisinopril (PRINIVIL;ZESTRIL) 20 MG tablet; Take 1 tablet by mouth daily

## 2025-05-08 NOTE — ASSESSMENT & PLAN NOTE
Continue escitalopram    Orders:    escitalopram (LEXAPRO) 10 MG tablet; Take 1 tablet by mouth daily

## 2025-05-08 NOTE — ASSESSMENT & PLAN NOTE
Continue atorvastatin    Orders:    atorvastatin (LIPITOR) 20 MG tablet; Take 1 tablet by mouth daily

## 2025-05-08 NOTE — ASSESSMENT & PLAN NOTE
Continue to decrease calorie intake and work on exercise daily to increase calories burned.

## 2025-05-08 NOTE — ASSESSMENT & PLAN NOTE
Continue Lantus 100 units daily, humalog 35 units with meals TID. Continue Jardiance. 3 month follow up.   A1c today at 13.  His Dexcom shows average blood sugar in the 140s and expected A1c at 9.1.  Will continue with current dose and repeat with venous lab draw at next follow-up    Orders:    POCT glycosylated hemoglobin (Hb A1C)    Continuous Glucose Sensor (DEXCOM G7 SENSOR) MISC; 1 each by Does not apply route every 14 days    glipiZIDE (GLUCOTROL) 10 MG tablet; Take 1 tablet by mouth 2 times daily (before meals)    LANTUS 100 UNIT/ML injection vial; Inject 100 Units into the skin nightly    insulin lispro, 1 Unit Dial, (HUMALOG KWIKPEN) 100 UNIT/ML SOPN; Inject 35 Units into the skin 3 times daily (before meals)    Insulin Syringe-Needle U-100 30G X 1/2\" 0.5 ML MISC; 1 each by Does not apply route daily    JARDIANCE 25 MG tablet; Take 1 tablet by mouth daily    Insulin Pen Needle (PEN NEEDLES 31GX5/16\") 31G X 8 MM MISC; 1 each by Does not apply route daily    Alcohol Swabs (ALCOHOL PADS) 70 % PADS; 1 each by Does not apply route 4 times daily    aspirin 81 MG EC tablet; Take 1 tablet by mouth daily

## 2025-07-01 DIAGNOSIS — E11.65 TYPE 2 DIABETES MELLITUS WITH HYPERGLYCEMIA, WITH LONG-TERM CURRENT USE OF INSULIN (HCC): ICD-10-CM

## 2025-07-01 DIAGNOSIS — Z79.4 TYPE 2 DIABETES MELLITUS WITH HYPERGLYCEMIA, WITH LONG-TERM CURRENT USE OF INSULIN (HCC): ICD-10-CM

## 2025-07-01 RX ORDER — INSULIN LISPRO 100 [IU]/ML
35 INJECTION, SOLUTION INTRAVENOUS; SUBCUTANEOUS
Qty: 13.5 ML | Refills: 2 | Status: SHIPPED | OUTPATIENT
Start: 2025-07-01 | End: 2025-09-29

## 2025-07-01 RX ORDER — BLOOD SUGAR DIAGNOSTIC
1 STRIP MISCELLANEOUS 4 TIMES DAILY
Qty: 400 EACH | Refills: 3 | Status: SHIPPED | OUTPATIENT
Start: 2025-07-01 | End: 2026-06-26

## 2025-07-01 RX ORDER — PEN NEEDLE, DIABETIC 31 GX5/16"
1 NEEDLE, DISPOSABLE MISCELLANEOUS DAILY
Qty: 100 EACH | Refills: 11 | Status: SHIPPED | OUTPATIENT
Start: 2025-07-01